# Patient Record
Sex: FEMALE | Race: WHITE | URBAN - METROPOLITAN AREA
[De-identification: names, ages, dates, MRNs, and addresses within clinical notes are randomized per-mention and may not be internally consistent; named-entity substitution may affect disease eponyms.]

---

## 2017-01-13 ENCOUNTER — OFFICE VISIT (OUTPATIENT)
Dept: PSYCHOLOGY | Facility: CLINIC | Age: 32
End: 2017-01-13

## 2017-01-13 DIAGNOSIS — F33.41 MAJOR DEPRESSIVE DISORDER, RECURRENT EPISODE, IN PARTIAL REMISSION WITH ANXIOUS DISTRESS (H): Primary | ICD-10-CM

## 2017-01-30 NOTE — PROGRESS NOTES
Health Psychology                  [facility]    Department of Medicine  Pilar Moe, Ph.D., L.P. [phone]                          Clinic and Surgery Center  [facility] Lenard Kim, Ph.D.,  L.P. [phone]                 Health Psychology - 3rd floor  [place] Mail Code 742   Zhao Chowdhury, Ph.D., DANAY.DEVENDRA.P.P., L.P. [phone]     [address] [place] [address] Carmela Taylor, Ph.D., L.P. [phone]  [place] [address]       Patient: Elizabeth Miner  Patient's YOB: 1985  MRN: [id number]  Psychologist: Lenard Kim, PhD,   Initial Evaluation Date: 8/5/2016  Treatment Plan Date: 8/5/2016      Health Psychology  Confidential Summary of Psychological Evaluation  Confidential Visit Summary    Service: Individual Psychotherapy     Start time:  1:05  Stop time:  2:00  Treatment modality:   Cognitive Behavioral and Supportive Psychotherapy   Insight Oriented Psychotherapy  Goals and Interventions:   Problem Goals   Mood Disorder  Depression  Anxiety   [x] Decrease symptoms  [x] Improve well being  [x] Improve coping  [] Change behavior/cognitions  [] Improve psychosocial support  [] Improve decision making  [] Improve self-care with diet and exercise  [] Improve sleep habits/sleep quality  [] Monitor psychological status     Interventions Employed Today   [x] Fostered healthy therapeutic alliance  [] Addressed unhealthy cognitions  [] Addressed unhealthy behaviors  [] Relaxation training  [] Psycho-education  [] Bibliotherapy  [x] Provided support  [] Explored/confronted resistance  [x] Developed insights into cognitions/behaviors  [x] Identified/Promoted adaptive coping strategies  [] Education/training in sleep   [] Education/training in mindfulness tools     Patient s progress on goals:   Discussed the holidays.  She employed some of the strategies discussed at our last meeting.  Discussed being with her brother who continues to  fight the cancer, despite the poor odds.  She sees the impact this has on her mother and worries about the prolonged de la torre on her.  Discussed the ways in which she is coping, in part by  herself emotionally from her brother.  Discussed this strategy and how it impacts her anxiety.    Discussed self-care in the lab and the challenges with setting healthy boundaries.   Reviewed general self-care tools.    Patient's response to treatment:  Engaged, reflective, motivated  Plan: Ongoing individual psychotherapy   Current mental health status:   General appearance:  Appropriate, well kept   Mood: Anxiety present, appears tense.    Cognition: Appropriate for age   Orientation: Times three   Insight: fair to good   Memory: Appropriate long term / Short term   Psychosis: Denies   Suicidal / Homicidal Thoughts: Denies      Diagnosis:    Axis I: Major Depressive Disorder, recurrent, in partial remission   Axis II: deferred   Axis III: see EMR   Axis IV: parental divorce, seriously ill family member        Lenard Kim, PhD  Licensed Psychologist  Pager: [phone]

## 2017-02-03 ENCOUNTER — OFFICE VISIT (OUTPATIENT)
Dept: PSYCHOLOGY | Facility: CLINIC | Age: 32
End: 2017-02-03

## 2017-02-03 DIAGNOSIS — F33.41 MAJOR DEPRESSIVE DISORDER, RECURRENT EPISODE, IN PARTIAL REMISSION WITH ANXIOUS DISTRESS (H): Primary | ICD-10-CM

## 2017-02-17 ENCOUNTER — OFFICE VISIT (OUTPATIENT)
Dept: PSYCHOLOGY | Facility: CLINIC | Age: 32
End: 2017-02-17

## 2017-02-17 DIAGNOSIS — F33.41 MAJOR DEPRESSIVE DISORDER, RECURRENT EPISODE, IN PARTIAL REMISSION WITH ANXIOUS DISTRESS (H): Primary | ICD-10-CM

## 2017-02-17 NOTE — MR AVS SNAPSHOT
After Visit Summary   2/17/2017    Elizabeth Miner    MRN: [id number]           Patient Information     Date Of Birth          1985        Visit Information        Provider Department      2/17/2017 12:00 PM Lenard Kim, PhD DOMINIC [facility]        Today's Diagnoses     Major depressive disorder, recurrent episode, in partial remission with anxious distress (H)    -  1       Follow-ups after your visit        Your next 10 appointments already scheduled     Mar 10, 2017  2:00 PM CST   (Arrive by 1:45 PM)   Return Visit with Lenard Kim, PhD DOMINIC   [facility] ([facility])    [address]  3rd [facility] [address] [phone]              Who to contact     Please call your clinic at [phone] to:    Ask questions about your health    Make or cancel appointments    Discuss your medicines    Learn about your test results    Speak to your doctor   If you have compliments or concerns about an experience at your clinic, or if you wish to file a complaint, please contact [facility] Patient Relations at [phone] or email us at [email or web address]         Additional Information About Your Visit        MyChart Information     [email or web address] gives you secure access to your electronic health record. If you see a primary care provider, you can also send messages to your care team and make appointments. If you have questions, please call your primary care clinic.  If you do not have a primary care provider, please call [phone] and they will assist you.      [employer] is an electronic gateway that provides easy, online access to your medical records. With [employer], you can request a clinic appointment, read your test results, renew a prescription or communicate with your care team.     To access your existing account, please contact your [facility] or  call [phone] for assistance.        Care EveryWhere ID     This is your Care EveryWhere ID. This could be used by other organizations to access your [place] medical records  [id number]         Blood Pressure from Last 3 Encounters:   10/31/16 111/77   05/20/16 118/76    Weight from Last 3 Encounters:   10/31/16 79.4 kg (175 lb)   05/20/16 75.7 kg (166 lb 12.8 oz)              Today, you had the following     No orders found for display       Primary Care Provider Office Phone # Fax #    Marisol Agnieszka Gray [phone]       [facility] [address] [facility] [address]  [facility] [address]        Thank you!     Thank you for choosing [facility] PRIMARY CARE CLINIC  for your care. Our goal is always to provide you with excellent care. Hearing back from our patients is one way we can continue to improve our services. Please take a few minutes to complete the written survey that you may receive in the mail after your visit with us. Thank you!             Your Updated Medication List - Protect others around you: Learn how to safely use, store and throw away your medicines at www.disposemymeds.org.          This list is accurate as of: 2/17/17 11:59 PM.  Always use your most recent med list.                   Brand Name Dispense Instructions for use    * FLUoxetine 40 MG capsule    PROzac    90 capsule    Take 1 capsule (40 mg) by mouth daily       * FLUoxetine 10 MG capsule    PROzac    90 capsule    Take 1 capsule (10 mg) by mouth daily       fluticasone 50 MCG/ACT spray    FLONASE     Spray 2 sprays into both nostrils as needed for rhinitis or allergies       levofloxacin 750 MG tablet    LEVAQUIN    7 tablet    Take 1 tablet (750 mg) by mouth daily       norgestimate-ethinyl estradiol 0.25-35 MG-MCG per tablet    ORTHO-CYCLEN, SPRINTEC    84 tablet    Take 1 tablet by mouth daily       * Notice:  This list has 2 medication(s) that are the same as other medications prescribed for you. Read  the directions carefully, and ask your doctor or other care provider to review them with you.

## 2017-02-22 NOTE — PROGRESS NOTES
Health Psychology                  [facility]    Department of Medicine  Pilar Moe, Ph.D., L.P. [phone]                          Clinic and Surgery Center  [facility] Lenard Kim, Ph.D.,  L.P. [phone]                 Health Psychology - 3rd floor  [place] Mail Code 74   Zhao Chowdhury, Ph.D., DANAY.DEVENDRA.P.P., L.P. [phone]     [address] [place] [address] Carmela Taylor, Ph.D., L.P. [phone]  [place] [address]       Patient: Elizabeth Miner  Patient's YOB: 1985  MRN: [id number]  Psychologist: Lenard Kim, PhD,   Initial Evaluation Date: 8/5/2016  Treatment Plan Date: 8/5/2016      Health Psychology  Confidential Summary of Psychological Evaluation  Confidential Visit Summary    Service: Individual Psychotherapy     Start time:  12:04  Stop time:  12:58  Treatment modality:   Cognitive Behavioral and Supportive Psychotherapy   Insight Oriented Psychotherapy  Goals and Interventions:   Problem Goals   Mood Disorder  Depression  Anxiety   [x] Decrease symptoms  [x] Improve well being  [x] Improve coping  [] Change behavior/cognitions  [x] Improve psychosocial support  [] Improve decision making  [] Improve self-care with diet and exercise  [] Improve sleep habits/sleep quality  [] Monitor psychological status     Interventions Employed Today   [x] Fostered healthy therapeutic alliance  [] Addressed unhealthy cognitions  [x] Addressed unhealthy behaviors  [] Relaxation training  [] Psycho-education  [] Bibliotherapy  [x] Provided support  [] Explored/confronted resistance  [x] Developed insights into cognitions/behaviors  [x] Identified/Promoted adaptive coping strategies  [] Education/training in sleep   [] Education/training in mindfulness tools     Patient s progress on goals:   Reports some improvement in anxiety since last session.  Discussed her current assessment of long term goals and her decision to not pursue  academia.    Discussed brothers condition and relationship with mother and father.    Discussed self-care.  Reviewed general self-care tools.    Patient's response to treatment:  Engaged, reflective, motivated  Plan: Ongoing individual psychotherapy   Current mental health status:   General appearance:  Appropriate, well kept   Mood: Anxious, though somewhat improved since last session.   Cognition: Appropriate for age   Orientation: Times three   Insight: fair to good   Memory: Appropriate long term / Short term   Psychosis: Denies   Suicidal / Homicidal Thoughts: Denies      Diagnosis:    Axis I: Major Depressive Disorder, recurrent, in partial remission   Axis II: deferred   Axis III: see EMR   Axis IV: parental divorce, seriously ill family member        Lenard Kim, PhD  Licensed Psychologist  Pager: [phone]

## 2017-03-10 ENCOUNTER — OFFICE VISIT (OUTPATIENT)
Dept: PSYCHOLOGY | Facility: CLINIC | Age: 32
End: 2017-03-10

## 2017-03-10 DIAGNOSIS — F33.41 MAJOR DEPRESSIVE DISORDER, RECURRENT EPISODE, IN PARTIAL REMISSION WITH ANXIOUS DISTRESS (H): Primary | ICD-10-CM

## 2017-03-10 NOTE — MR AVS SNAPSHOT
After Visit Summary   3/10/2017    Elizabeth Miner    MRN: [id number]           Patient Information     Date Of Birth          1985        Visit Information        Provider Department      3/10/2017 2:00 PM Lenard Kim, PhD [facility]        Today's Diagnoses     Major depressive disorder, recurrent episode, in partial remission with anxious distress (H)    -  1       Follow-ups after your visit        Your next 10 appointments already scheduled     May 23, 2017  1:00 PM CDT   (Arrive by 12:45 PM)   PHYSICAL with Marisol Gray MD   [facility] ([facility])    [address] [facility]  4th [facility] [address] [phone]              Who to contact     Please call your clinic at [phone] to:    Ask questions about your health    Make or cancel appointments    Discuss your medicines    Learn about your test results    Speak to your doctor   If you have compliments or concerns about an experience at your clinic, or if you wish to file a complaint, please contact [facility] Patient Relations at [phone] or email us at [email or web address]         Additional Information About Your Visit        MyChart Information     [employer] gives you secure access to your electronic health record. If you see a primary care provider, you can also send messages to your care team and make appointments. If you have questions, please call your primary care clinic.  If you do not have a primary care provider, please call [phone] and they will assist you.      [employer] is an electronic gateway that provides easy, online access to your medical records. With [employer], you can request a clinic appointment, read your test results, renew a prescription or communicate with your care team.     To access your existing account, please contact your [facility] or  call [phone] for assistance.        Care EveryWhere ID     This is your Care EveryWhere ID. This could be used by other organizations to access your [place] medical records  [id number]         Blood Pressure from Last 3 Encounters:   05/01/17 127/88   10/31/16 111/77   05/20/16 118/76    Weight from Last 3 Encounters:   05/01/17 78.5 kg (173 lb 1.6 oz)   10/31/16 79.4 kg (175 lb)   05/20/16 75.7 kg (166 lb 12.8 oz)              Today, you had the following     No orders found for display       Primary Care Provider Office Phone # Fax #    Marisol Agnieszka Gray [phone]       [address]  [facility] [address]        Thank you!     Thank you for choosing [facility] PRIMARY CARE CLINIC  for your care. Our goal is always to provide you with excellent care. Hearing back from our patients is one way we can continue to improve our services. Please take a few minutes to complete the written survey that you may receive in the mail after your visit with us. Thank you!             Your Updated Medication List - Protect others around you: Learn how to safely use, store and throw away your medicines at www.disposemymeds.org.          This list is accurate as of: 3/10/17 11:59 PM.  Always use your most recent med list.                   Brand Name Dispense Instructions for use    fluticasone 50 MCG/ACT spray    FLONASE     Spray 2 sprays into both nostrils as needed for rhinitis or allergies       levofloxacin 750 MG tablet    LEVAQUIN    7 tablet    Take 1 tablet (750 mg) by mouth daily

## 2017-04-28 ENCOUNTER — OFFICE VISIT (OUTPATIENT)
Dept: PSYCHOLOGY | Facility: CLINIC | Age: 32
End: 2017-04-28

## 2017-04-28 DIAGNOSIS — F33.41 MAJOR DEPRESSIVE DISORDER, RECURRENT EPISODE, IN PARTIAL REMISSION WITH ANXIOUS DISTRESS (H): Primary | ICD-10-CM

## 2017-04-28 NOTE — MR AVS SNAPSHOT
After Visit Summary   4/28/2017    Elizabeth Miner    MRN: [id number]           Patient Information     Date Of Birth          1985        Visit Information        Provider Department      4/28/2017 4:00 PM Lenard Kim, PhD DOMINIC [facility]        Today's Diagnoses     Major depressive disorder, recurrent episode, in partial remission with anxious distress (H)    -  1       Follow-ups after your visit        Your next 10 appointments already scheduled     Jun 05, 2017 12:00 PM CDT   (Arrive by 11:45 AM)   Return Visit with Lenard Kim, PhD DOMINIC   [facility] ([facility] [place])    [address]  3rd [facility] [address] [phone]              Who to contact     Please call your clinic at [phone] to:    Ask questions about your health    Make or cancel appointments    Discuss your medicines    Learn about your test results    Speak to your doctor   If you have compliments or concerns about an experience at your clinic, or if you wish to file a complaint, please contact [facility] Patient Relations at [phone] or email us at [email or web address]         Additional Information About Your Visit        MyChart Information     [employer] gives you secure access to your electronic health record. If you see a primary care provider, you can also send messages to your care team and make appointments. If you have questions, please call your primary care clinic.  If you do not have a primary care provider, please call [phone] and they will assist you.      [employer] is an electronic gateway that provides easy, online access to your medical records. With [employer], you can request a clinic appointment, read your test results, renew a prescription or communicate with your care team.     To access your existing account, please contact your [facility] or  call [phone] for assistance.        Care EveryWhere ID     This is your Care EveryWhere ID. This could be used by other organizations to access your [place] medical records  [id number]         Blood Pressure from Last 3 Encounters:   05/23/17 116/74   05/01/17 127/88   10/31/16 111/77    Weight from Last 3 Encounters:   05/23/17 78.2 kg (172 lb 8 oz)   05/01/17 78.5 kg (173 lb 1.6 oz)   10/31/16 79.4 kg (175 lb)              Today, you had the following     No orders found for display       Primary Care Provider Office Phone # Fax #    Marisol Agnieszka Gray [phone]       [address]  [facility] [address]        Thank you!     Thank you for choosing [facility] PRIMARY CARE CLINIC  for your care. Our goal is always to provide you with excellent care. Hearing back from our patients is one way we can continue to improve our services. Please take a few minutes to complete the written survey that you may receive in the mail after your visit with us. Thank you!             Your Updated Medication List - Protect others around you: Learn how to safely use, store and throw away your medicines at www.disposemymeds.org.          This list is accurate as of: 4/28/17 11:59 PM.  Always use your most recent med list.                   Brand Name Dispense Instructions for use    * FLUoxetine 40 MG capsule    PROzac    90 capsule    Take 1 capsule (40 mg) by mouth daily       * FLUoxetine 10 MG capsule    PROzac    90 capsule    Take 1 capsule (10 mg) by mouth daily       fluticasone 50 MCG/ACT spray    FLONASE     Spray 2 sprays into both nostrils as needed for rhinitis or allergies       norgestimate-ethinyl estradiol 0.25-35 MG-MCG per tablet    ORTHO-CYCLEN, SPRINTEC    84 tablet    Take 1 tablet by mouth daily       * Notice:  This list has 2 medication(s) that are the same as other medications prescribed for you. Read the directions carefully, and ask your doctor or  other care provider to review them with you.

## 2017-05-01 ENCOUNTER — OFFICE VISIT (OUTPATIENT)
Dept: FAMILY MEDICINE | Facility: CLINIC | Age: 32
End: 2017-05-01

## 2017-05-01 VITALS
TEMPERATURE: 97.7 F | BODY MASS INDEX: 29.55 KG/M2 | OXYGEN SATURATION: 97 % | HEART RATE: 92 BPM | WEIGHT: 173.1 LBS | HEIGHT: 64 IN | DIASTOLIC BLOOD PRESSURE: 88 MMHG | SYSTOLIC BLOOD PRESSURE: 127 MMHG

## 2017-05-01 DIAGNOSIS — L03.818 CELLULITIS OF OTHER SPECIFIED SITE: Primary | ICD-10-CM

## 2017-05-01 RX ORDER — SULFAMETHOXAZOLE/TRIMETHOPRIM 800-160 MG
1 TABLET ORAL 2 TIMES DAILY
Qty: 14 TABLET | Refills: 0 | Status: SHIPPED | OUTPATIENT
Start: 2017-05-01 | End: 2018-03-07

## 2017-05-01 ASSESSMENT — PATIENT HEALTH QUESTIONNAIRE - PHQ9: 5. POOR APPETITE OR OVEREATING: NEARLY EVERY DAY

## 2017-05-01 ASSESSMENT — ANXIETY QUESTIONNAIRES
IF YOU CHECKED OFF ANY PROBLEMS ON THIS QUESTIONNAIRE, HOW DIFFICULT HAVE THESE PROBLEMS MADE IT FOR YOU TO DO YOUR WORK, TAKE CARE OF THINGS AT HOME, OR GET ALONG WITH OTHER PEOPLE: SOMEWHAT DIFFICULT
2. NOT BEING ABLE TO STOP OR CONTROL WORRYING: NOT AT ALL
7. FEELING AFRAID AS IF SOMETHING AWFUL MIGHT HAPPEN: NOT AT ALL
1. FEELING NERVOUS, ANXIOUS, OR ON EDGE: MORE THAN HALF THE DAYS
3. WORRYING TOO MUCH ABOUT DIFFERENT THINGS: SEVERAL DAYS
6. BECOMING EASILY ANNOYED OR IRRITABLE: NEARLY EVERY DAY

## 2017-05-01 ASSESSMENT — PAIN SCALES - GENERAL: PAINLEVEL: MILD PAIN (2)

## 2017-05-01 NOTE — MR AVS SNAPSHOT
After Visit Summary   5/1/2017    Elizabeth Miner    MRN: [id number]           Patient Information     Date Of Birth          1985        Visit Information        Provider Department      5/1/2017 10:30 AM Keila Arora APRN [facility]        Today's Diagnoses     Cellulitis of other specified site    -  1       Follow-ups after your visit        Your next 10 appointments already scheduled     May 23, 2017  1:00 PM CDT   (Arrive by 12:45 PM)   PHYSICAL with Marisol Gray MD   [facility] ([facility])    [address] [place] [address] [phone]              Who to contact     Please call your clinic at [phone] to:    Ask questions about your health    Make or cancel appointments    Discuss your medicines    Learn about your test results    Speak to your doctor   If you have compliments or concerns about an experience at your clinic, or if you wish to file a complaint, please contact [facility] Patient Relations at [phone] or email us at [email or web address]         Additional Information About Your Visit        MyChart Information     [employer] gives you secure access to your electronic health record. If you see a primary care provider, you can also send messages to your care team and make appointments. If you have questions, please call your primary care clinic.  If you do not have a primary care provider, please call [phone] and they will assist you.      [employer] is an electronic gateway that provides easy, online access to your medical records. With [employer], you can request a clinic appointment, read your test results, renew a prescription or communicate with your care team.     To access your existing account, please contact your [facility] or call [phone] for assistance.        Care EveryWhere ID   "   This is your Care EveryWhere ID. This could be used by other organizations to access your [place] medical records  [id number]        Your Vitals Were     Pulse Temperature Height Pulse Oximetry Breastfeeding? BMI (Body Mass Index)    92 97.7  F (36.5  C) (Oral) 5' 4.3\" (163.3 cm) 97% No 29.44 kg/m2       Blood Pressure from Last 3 Encounters:   05/01/17 127/88   10/31/16 111/77   05/20/16 118/76    Weight from Last 3 Encounters:   05/01/17 173 lb 1.6 oz (78.5 kg)   10/31/16 175 lb (79.4 kg)   05/20/16 166 lb 12.8 oz (75.7 kg)              Today, you had the following     No orders found for display         Today's Medication Changes          These changes are accurate as of: 5/1/17  2:07 PM.  If you have any questions, ask your nurse or doctor.               Start taking these medicines.        Dose/Directions    sulfamethoxazole-trimethoprim 800-160 MG per tablet   Commonly known as:  BACTRIM DS/SEPTRA DS   Used for:  Cellulitis of other specified site   Started by:  Keila Arora APRN CNP        Dose:  1 tablet   Take 1 tablet by mouth 2 times daily   Quantity:  14 tablet   Refills:  0            Where to get your medicines      These medications were sent to [facility] [place], [place] - [address] [facility] [address] [facility] [address]  [address] [facility] [address] [place] [address]    Hours:  TRANSPLANT PHONE NUMBER [phone] Phone:  [phone]     sulfamethoxazole-trimethoprim 800-160 MG per tablet                Primary Care Provider Office Phone # Fax #    Marisol Gary [phone]       [address]  [facility] [address]        Thank you!     Thank you for choosing [facility]  for your care. Our goal is always to provide you with excellent care. Hearing back from our patients is one way we can continue to improve our services. Please take a few minutes to complete the written survey that you may receive in the " mail after your visit with us. Thank you!             Your Updated Medication List - Protect others around you: Learn how to safely use, store and throw away your medicines at www.disposemymeds.org.          This list is accurate as of: 5/1/17  2:07 PM.  Always use your most recent med list.                   Brand Name Dispense Instructions for use    * FLUoxetine 40 MG capsule    PROzac    90 capsule    Take 1 capsule (40 mg) by mouth daily       * FLUoxetine 10 MG capsule    PROzac    90 capsule    Take 1 capsule (10 mg) by mouth daily       fluticasone 50 MCG/ACT spray    FLONASE     Spray 2 sprays into both nostrils as needed for rhinitis or allergies       levofloxacin 750 MG tablet    LEVAQUIN    7 tablet    Take 1 tablet (750 mg) by mouth daily       norgestimate-ethinyl estradiol 0.25-35 MG-MCG per tablet    ORTHO-CYCLEN, SPRINTEC    84 tablet    Take 1 tablet by mouth daily       sulfamethoxazole-trimethoprim 800-160 MG per tablet    BACTRIM DS/SEPTRA DS    14 tablet    Take 1 tablet by mouth 2 times daily       * Notice:  This list has 2 medication(s) that are the same as other medications prescribed for you. Read the directions carefully, and ask your doctor or other care provider to review them with you.

## 2017-05-01 NOTE — NURSING NOTE
"31 year old  Chief Complaint   Patient presents with     Infection     just got cartilage piercing, right ear, two weeks piercing, swollen.        Blood pressure 127/88, pulse 92, temperature 97.7  F (36.5  C), temperature source Oral, height 5' 4.3\" (163.3 cm), weight 173 lb 1.6 oz (78.5 kg), SpO2 97 %, not currently breastfeeding. Body mass index is 29.44 kg/(m^2).  BP completed using cuff size: regular    Patient Active Problem List   Diagnosis     Major depressive disorder, recurrent episode, in partial remission with anxious distress (H)       Wt Readings from Last 2 Encounters:   05/01/17 173 lb 1.6 oz (78.5 kg)   10/31/16 175 lb (79.4 kg)     BP Readings from Last 3 Encounters:   05/01/17 127/88   10/31/16 111/77   05/20/16 118/76       Current Outpatient Prescriptions   Medication     FLUoxetine (PROZAC) 40 MG capsule     FLUoxetine (PROZAC) 10 MG capsule     norgestimate-ethinyl estradiol (ORTHO-CYCLEN, SPRINTEC) 0.25-35 MG-MCG per tablet     fluticasone (FLONASE) 50 MCG/ACT nasal spray     levofloxacin (LEVAQUIN) 750 MG tablet     No current facility-administered medications for this visit.        Social History   Substance Use Topics     Smoking status: Never Smoker     Smokeless tobacco: Not on file     Alcohol use Not on file       There are no preventive care reminders to display for this patient.    Lab Results   Component Value Date    PAP NIL 05/20/2016       PHQ-2 ( 1999 Pfizer) 5/1/2017 9/19/2016   Q1: Little interest or pleasure in doing things 1 -   Q2: Feeling down, depressed or hopeless 1 -   PHQ-2 Score 2 -   Little interest or pleasure in doing things - More than half the days   Feeling down, depressed or hopeless - More than half the days   PHQ-2 Score - 4       PHQ-9 SCORE 8/27/2016 9/19/2016 10/31/2016 5/1/2017   Total Score MyChart 11 (Moderate depression) 9 (Mild depression) - -   Total Score - - 10 4   Some encounter information is confidential and restricted. Go to Review Flowsheets " activity to see all data.       ELY-7 SCORE 8/27/2016 9/19/2016 10/31/2016   Total Score 3 (minimal anxiety) 3 (minimal anxiety) -   Total Score - - 3   Some encounter information is confidential and restricted. Go to Review Flowsheets activity to see all data.       No flowsheet data found.    Erica Sweeney MA  May 1, 2017 10:40 AM

## 2017-05-01 NOTE — PROGRESS NOTES
HPI:       Elizabeth Miner is a 31 year old who presents following a piercing in her right helix. It is swollen and red. She is concerned it is infected. She denies fevers, nausea, vomiting, weakness, or other symptoms. She is unable to tell if it is draining. She is washing with soap and water, rinsing with saline. She is seen at the primary care clinic at the [facility]. She has multiple antibiotic allergies and were reviewed with pt.     Problem, Medication and Allergy Lists were   reviewed and are current.     Patient Active Problem List    Diagnosis Date Noted     Major depressive disorder, recurrent episode, in partial remission with anxious distress (H) 08/05/2016     Priority: Medium   ,     Current Outpatient Prescriptions   Medication Sig Dispense Refill     sulfamethoxazole-trimethoprim (BACTRIM DS/SEPTRA DS) 800-160 MG per tablet Take 1 tablet by mouth 2 times daily 14 tablet 0     FLUoxetine (PROZAC) 40 MG capsule Take 1 capsule (40 mg) by mouth daily 90 capsule 0     FLUoxetine (PROZAC) 10 MG capsule Take 1 capsule (10 mg) by mouth daily 90 capsule 0     norgestimate-ethinyl estradiol (ORTHO-CYCLEN, SPRINTEC) 0.25-35 MG-MCG per tablet Take 1 tablet by mouth daily 84 tablet 3     fluticasone (FLONASE) 50 MCG/ACT nasal spray Spray 2 sprays into both nostrils as needed for rhinitis or allergies       levofloxacin (LEVAQUIN) 750 MG tablet Take 1 tablet (750 mg) by mouth daily (Patient not taking: Reported on 5/1/2017) 7 tablet 0   ,     Allergies   Allergen Reactions     Azithromycin      Lips swell     Cephalexin      hives     Penicillins      hives     Patient is   a new patient to this clinic and so  I reviewed/updated the Past Medical History, the Family History and the Social History. , No past medical history on file.,   Family History        Negative family history of: Glaucoma, Macular Degeneration       and   Social History     Social History     Marital status: Single     Spouse name: N/A      "Number of children: N/A     Years of education: N/A     Social History Main Topics     Smoking status: Never Smoker     Smokeless tobacco: None     Alcohol use None     Drug use: None     Sexual activity: Not Asked     Other Topics Concern     None     Social History Narrative            Review of Systems:   Review of Systems          Physical Exam:   Patient Vitals for the past 24 hrs:   BP Temp Temp src Pulse SpO2 Height Weight   05/01/17 1035 127/88 97.7  F (36.5  C) Oral 92 97 % 5' 4.3\" (163.3 cm) 173 lb 1.6 oz (78.5 kg)     Body mass index is 29.44 kg/(m^2).  Vitals were reviewed and were normal     Physical Exam   Constitutional: She is oriented to person, place, and time. She appears well-developed and well-nourished.   HENT:   Head: Normocephalic and atraumatic.   Left Ear: External ear normal.   Nose: Nose normal.   Mouth/Throat: Oropharynx is clear and moist. No oropharyngeal exudate.   Right helix with erythema, edema, warmth. Purulent drainage upon palpation.    Eyes: Conjunctivae and EOM are normal. Pupils are equal, round, and reactive to light.   Neck: Normal range of motion. Neck supple.   Cardiovascular: Normal rate, regular rhythm and normal heart sounds.    Pulmonary/Chest: Effort normal and breath sounds normal. She has no wheezes. She has no rales. She exhibits no tenderness.   Abdominal: Soft. She exhibits no distension. There is no tenderness.   Musculoskeletal: Normal range of motion.   Lymphadenopathy:     She has no cervical adenopathy.   Neurological: She is alert and oriented to person, place, and time. She has normal reflexes.   Skin: Skin is warm and dry. No rash noted. No erythema.   Psychiatric: She has a normal mood and affect. Her behavior is normal. Judgment and thought content normal.         Results:      Results from the last 24 hoursNo results found for this or any previous visit (from the past 24 hour(s)).  Assessment and Plan       Diagnoses and all orders for this " visit:    Cellulitis of the ear  sulfamethoxazole-trimethoprim (BACTRIM DS/SEPTRA DS) 800-160 MG per tablet; Take 1 tablet by mouth 2 times daily x 7 days  On exam erythematous, edematous, warm, tender, draining purulent exudate. Will treat as cellulitis. If any worsening pt may need to remove piercing. Continue to wash with soap and water. RTC if no improvement in symptoms      There are no discontinued medications.  Options for treatment and follow-up care were reviewed with the patient. Elizabeth Miner  engaged in the decision making process and verbalized understanding of the options discussed and agreed with the final plan.    THOMPSON Swan CNP

## 2017-05-01 NOTE — PROGRESS NOTES
Health Psychology                  [facility]    Department of Medicine  Pilar Moe, Ph.D., L.P. [phone]                          Clinic and Surgery Center  [facility] Lenard Kim, Ph.D.,  L.P. [phone]                 Health Psychology - 3rd floor  [place] [address]   Zhao Chowdhury, Ph.D., A.B.P.P., L.P. [phone]     [address] [place] [address] Carmela Taylor, Ph.D., L.P. [phone]  [place] [address]       Patient: Elizabeth Miner  Patient's YOB: 1985  MRN: [id number]  Psychologist: Lenard Kim, PhD,   Initial Evaluation Date: 8/5/2016  Treatment Plan Date: 8/5/2016      Health Psychology  Confidential Summary of Psychological Evaluation  Confidential Visit Summary    Service: Individual Psychotherapy     Start time:  2:06  Stop time:  2:50  Treatment modality:   Cognitive Behavioral and Supportive Psychotherapy   Insight Oriented Psychotherapy  Goals and Interventions:   Problem Goals   Mood Disorder  Depression  Anxiety   [x] Decrease symptoms  [x] Improve well being  [x] Improve coping  [] Change behavior/cognitions  [x] Improve psychosocial support  [] Improve decision making  [] Improve self-care with diet and exercise  [] Improve sleep habits/sleep quality  [] Monitor psychological status     Interventions Employed Today   [x] Fostered healthy therapeutic alliance  [] Addressed unhealthy cognitions  [x] Addressed unhealthy behaviors  [] Relaxation training  [] Psycho-education  [] Bibliotherapy  [x] Provided support  [] Explored/confronted resistance  [x] Developed insights into cognitions/behaviors  [x] Identified/Promoted adaptive coping strategies  [] Education/training in sleep   [] Education/training in mindfulness tools     Patient s progress on goals:   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  Discussed the difficulty she has with setting and  maintaining boundaries with others.  Discussed self-care tools.  Encouraged exercise and regular work breaks.   Patient's response to treatment:  Engaged, reflective, motivated  Plan: Ongoing individual psychotherapy   Current mental health status:   General appearance:  Appropriate, well kept   Mood: anxiety, mild   Affect: mood congruent    Cognition: Appropriate for age   Orientation: Times three   Insight: fair to good   Memory: Appropriate long term / Short term   Psychosis: Denies   Suicidal / Homicidal Thoughts: Denies      Diagnosis:    Axis I: Major Depressive Disorder, recurrent, in partial remission   Axis II: deferred   Axis III: see EMR   Axis IV: parental divorce, seriously ill family member        Lenard Kim, PhD  Licensed Psychologist  Pager: [phone]

## 2017-05-02 ASSESSMENT — PATIENT HEALTH QUESTIONNAIRE - PHQ9: SUM OF ALL RESPONSES TO PHQ QUESTIONS 1-9: 4

## 2017-05-23 ENCOUNTER — OFFICE VISIT (OUTPATIENT)
Dept: INTERNAL MEDICINE | Facility: CLINIC | Age: 32
End: 2017-05-23

## 2017-05-23 VITALS
HEIGHT: 64 IN | SYSTOLIC BLOOD PRESSURE: 116 MMHG | DIASTOLIC BLOOD PRESSURE: 74 MMHG | HEART RATE: 93 BPM | WEIGHT: 172.5 LBS | BODY MASS INDEX: 29.45 KG/M2

## 2017-05-23 DIAGNOSIS — F43.21 ADJUSTMENT DISORDER WITH DEPRESSED MOOD: Primary | ICD-10-CM

## 2017-05-23 RX ORDER — FLUOXETINE HYDROCHLORIDE 60 MG/1
60 TABLET, FILM COATED ORAL; ORAL DAILY
Qty: 90 TABLET | Refills: 3 | Status: SHIPPED | OUTPATIENT
Start: 2017-05-23 | End: 2018-03-29

## 2017-05-23 ASSESSMENT — ENCOUNTER SYMPTOMS
INCREASED ENERGY: 0
STIFFNESS: 1
DEPRESSION: 1
PANIC: 0
NIGHT SWEATS: 0
DECREASED APPETITE: 0
DECREASED LIBIDO: 0
CONSTIPATION: 0
NAUSEA: 0
CHILLS: 0
ARTHRALGIAS: 0
TREMORS: 0
JOINT SWELLING: 0
SEIZURES: 0
JAUNDICE: 0
POLYDIPSIA: 0
DIARRHEA: 0
HEADACHES: 0
WEIGHT LOSS: 0
VOMITING: 0
ALTERED TEMPERATURE REGULATION: 0
HEARTBURN: 0
FEVER: 0
WEIGHT GAIN: 1
NUMBNESS: 0
ABDOMINAL PAIN: 0
MEMORY LOSS: 0
INSOMNIA: 0
BOWEL INCONTINENCE: 0
TINGLING: 1
BLOOD IN STOOL: 0
BACK PAIN: 0
RECTAL BLEEDING: 1
HALLUCINATIONS: 0
DISTURBANCES IN COORDINATION: 0
DIZZINESS: 0
NECK PAIN: 0
DECREASED CONCENTRATION: 1
NERVOUS/ANXIOUS: 1
SPEECH CHANGE: 0
MUSCLE WEAKNESS: 0
PARALYSIS: 0
POLYPHAGIA: 0
FATIGUE: 1
LOSS OF CONSCIOUSNESS: 0
WEAKNESS: 0
BLOATING: 0
MYALGIAS: 0
RECTAL PAIN: 0
HOT FLASHES: 0
MUSCLE CRAMPS: 1

## 2017-05-23 ASSESSMENT — ACTIVITIES OF DAILY LIVING (ADL)
ARE_THERE_CARBON_MONOXIDE_DETECTORS_IN_YOUR_HOME?: Y
ARE_THERE_SMOKE_DETECTORS_IN_YOUR_HOME?: Y
DO_MEMBERS_OF_YOUR_HOUSEHOLD_USE_SAFETY_HELMETS?: Y
DO_MEMBERS_OF_YOUR_HOUSEHOLD_WEAR_SEAT_BELTS?: Y
ARE_THERE_FIREARMS_IN_YOUR_HOME?: N
DO_MEMBERS_OF_YOUR_HOUSEHOLD_USE_SUNSCREEN?: Y

## 2017-05-23 ASSESSMENT — PAIN SCALES - GENERAL: PAINLEVEL: NO PAIN (0)

## 2017-05-23 NOTE — PATIENT INSTRUCTIONS
[facility] Medication Refill Request Information:  * Please contact your pharmacy regarding ANY request for medication refills.  ** [facility] Prescription Fax = [phone]  * Please allow 3 business days for routine medication refills.  * Please allow 5 business days for controlled substance medication refills.     [facility] Test Result notification information:  *You will be notified with in 7-10 days of your appointment day regarding the results of your test.  If you are on MyChart you will be notified as soon as the provider has reviewed the results and signed off on them.    [facility] [phone]

## 2017-05-23 NOTE — PROGRESS NOTES
"Chief Complaint  Elizabeth Miner is a 31 year old female with a history of asplenia, MDD with anxiety who presents for her annual physical exam and f/u depression.    SUBJECTIVE      Annual Physical Exam:  Last seen 5/20/2016.  No acute events since that time.      Depression/Anxiety:  One week ago, patient increased her fluoxetine to 60 mg QD from 50 mg QD.  She is experiencing increased stress related to sick family member and parents' divorce.  Reports the new dose is working fine.  Denies suicidal ideation, thoughts of self-harm, overuse of alcohol, illicit drugs or prescribed medications.  No affect on her ability to work or perform ADL. Still seeing Dr. Kim, a Psychologist here at the [facility], every 2-3 weeks.      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  Her discomfort is not associated with pain, burning, numbness/tingling, weakness, recent insect bites, or trauma.  It is made better with movement, particularly walking, and PT exercises for plantar fasciitis that she discovered on the internet.  No interference with work or ADL.      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  It has returned but does not interfere with her work or ability to perform ADL.    Medications and Allergies  Reviewed and updated in the patient's chart as needed.     Health Hazards  Never smoker. Occasional social alcohol use.  Denies illicit drug use.  Not socially active outside of her lab mates.    Preventative Screening  Up to date on vaccinations and age-appropriate health-screening.    Review of Systems  A 10-point ROS was negative other than that stated above.    OBJECTIVE  /74  Pulse 93  Ht 1.631 m (5' 4.21\")  Wt 78.2 kg (172 lb 8 oz)  Breastfeeding? No  BMI 29.41 kg/m2    Physical Exam   GENERAL:  " Awake. No acute distress. Pleasant and in good spirits.  SKIN:  No rash, excoriations, ecchymoses or discolorations.  HEENT:  Normocephalic.  Anicteric sclera. Tympanic membranes white and opaque no erythema or bulging. Mild cerumen in the left eart. Patent nares. Good dentition. Oropharyngeal mucosa pink, moist, no erythema or exudates.  NECK:  Full ROM. No lymphadenopathy, or goiter.   CARDIOVASCULAR:  RRR.  Normal S1 and S2.  No murmurs, rubs, gallops or clicks.  PULMONARY:  CTAB. No wheezes, rales or rhonchi.  ABDOMEN & PELVIS:  Midline healed surgical scar extending from sternum to umbilicus.  Normoactive bowel sounds.  Non-tender, non-distended. No masses or hepatosplenomegaly.  EXTREMITIES:  Full ROM in all extremities.  Normal left-unilateral wrist extension and flexion.  Negative Tinel sign.  No weakness No peripheral edema. Peripheral pulses 2+ bilaterally.  Negative Tinel test and Phalen.    Neurological Exam  Alert and oriented to person, place, time and situation.  No gross or focal deficits.  No anosmia.  PERRLA. EOMI.  Muscles of mastication strong.  No ptosis. Facial muscles strong and symmetric. Hearing intact bilaterally.  Elevates palate, no hoarse voice. Trapezii and SCM strength 5/5.  Tongue protrudes midline.  Strength 5/5 and DTR 2+ bilaterally in all extremities. Touch sensation intact bilaterally.  Negative Romberg.  No tremor or dysmetria.  Normal station and gait.    Psychiatric Exam  Well-groomed.  Normal affect.  Mood congruent.  Behavior: appropriate with good eye-contact.  Attitude: cooperative.  Speech not tangential or circumferential.  No delusions or auditory/visual hallucinations.    Labs, Tests & Imaging  No new results to discuss at this time.    ASSESSMENT / PLAN  #Health Maintenance and Screening: Patient is in good health. Physical exam was benign.  UTD on all vaccinations. Normal periods.      Pap due next year; thereafter Q5 years with HPV co-testing    Refills of her OCP  offered.    Will discuss an exercise regimen at our next visit.    #Depression/Anxiety: Well-managed on medication. No acute events.      Increase fluoxetine to 60 mg QD, Rx provided.    Continue seeing your therapist as needed.    #Bilateral pedal stiffness: The DDx of stiffness after prolonged immobility in this patient includes: 1) lack of regular exercise, and 2) increased depression/anxiety.  Other etiologies are possible.  Lack of fever, chills, bone pain, traumatic fractures, sepsis, make a more serious cause unlikely. If patient develops any of these signs/sx, imaging should be considered.       Discuss more regular exercise regimen at a later visit.    Encouraged patient to move more throughout the day.    #Ulnar Neuropathy, left unilateral: Sx have improved. No need for EMG confirmatory testing at this time.       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    Bri Parkinson, Ph.M.  Internal Medicine,  MS4  2:09 PM, May 23, 2017    Scribe Disclosure:   I, Bri Parkinson, am serving as a scribe to document services personally performed by Staff-Dr. Marisol Gray based review of patient's EHR in [employer], and provider statements to me.    The medical student acted as scribe and the encounter documented above was completely performed by myself.  Supervising Doctor Isaac Castro

## 2017-05-23 NOTE — NURSING NOTE
Chief Complaint   Patient presents with     Physical     Patient here for physical.      Guevara Abreu CMA at 1:02 PM on 5/23/2017.

## 2017-05-23 NOTE — MR AVS SNAPSHOT
After Visit Summary   5/23/2017    Elizabeth Miner    MRN: [id number]           Patient Information     Date Of Birth          1985        Visit Information        Provider Department      5/23/2017 1:00 PM Marisol Serrato MD [facility]        Today's Diagnoses     Adjustment disorder with depressed mood    -  1      Care Instructions    Primary Care Center Medication Refill Request Information:  * Please contact your pharmacy regarding ANY request for medication refills.  ** [facility] Prescription Fax = [phone]  * Please allow 3 business days for routine medication refills.  * Please allow 5 business days for controlled substance medication refills.     Primary Care Center Test Result notification information:  *You will be notified with in 7-10 days of your appointment day regarding the results of your test.  If you are on MyChart you will be notified as soon as the provider has reviewed the results and signed off on them.    [facility] [phone]           Follow-ups after your visit        Your next 10 appointments already scheduled     Jun 05, 2017 12:00 PM CDT   (Arrive by 11:45 AM)   Return Visit with Lenard Kim, PhD [facility] ([facility])    [address] [place] [id number] [phone]              Who to contact     Please call your clinic at [phone] to:    Ask questions about your health    Make or cancel appointments    Discuss your medicines    Learn about your test results    Speak to your doctor   If you have compliments or concerns about an experience at your clinic, or if you wish to file a complaint, please contact [facility] Patient Relations at [phone] or email us at [email or web address]         Additional Information About Your Visit        MyChart Information     [employer] gives you secure access to your  "electronic health record. If you see a primary care provider, you can also send messages to your care team and make appointments. If you have questions, please call your primary care clinic.  If you do not have a primary care provider, please call [phone] and they will assist you.      [employer] is an electronic gateway that provides easy, online access to your medical records. With [employer], you can request a clinic appointment, read your test results, renew a prescription or communicate with your care team.     To access your existing account, please contact your [facility] or call [phone] for assistance.        Care EveryWhere ID     This is your Care EveryWhere ID. This could be used by other organizations to access your [facility] medical records  [id number]        Your Vitals Were     Pulse Height Breastfeeding? BMI (Body Mass Index)          93 1.631 m (5' 4.21\") No 29.41 kg/m2         Blood Pressure from Last 3 Encounters:   05/23/17 116/74   05/01/17 127/88   10/31/16 111/77    Weight from Last 3 Encounters:   05/23/17 78.2 kg (172 lb 8 oz)   05/01/17 78.5 kg (173 lb 1.6 oz)   10/31/16 79.4 kg (175 lb)              Today, you had the following     No orders found for display         Today's Medication Changes          These changes are accurate as of: 5/23/17  2:11 PM.  If you have any questions, ask your nurse or doctor.               These medicines have changed or have updated prescriptions.        Dose/Directions    * FLUoxetine 40 MG capsule   Commonly known as:  PROzac   This may have changed:    - Another medication with the same name was added. Make sure you understand how and when to take each.  - Another medication with the same name was changed. Make sure you understand how and when to take each.   Used for:  Adjustment disorder with depressed mood   Changed by:  Marisol Serrato MD        Dose:  40 mg   Take 1 capsule (40 mg) by mouth daily "   Quantity:  90 capsule   Refills:  0       * FLUoxetine 10 MG capsule   Commonly known as:  PROzac   This may have changed:    - how much to take  - additional instructions   Used for:  Adjustment disorder with depressed mood   Changed by:  Marisol Serrato MD        Dose:  10 mg   Take 1 capsule (10 mg) by mouth daily   Quantity:  90 capsule   Refills:  0       * FLUoxetine HCl 60 MG Tabs   This may have changed:  You were already taking a medication with the same name, and this prescription was added. Make sure you understand how and when to take each.   Used for:  Adjustment disorder with depressed mood   Changed by:  Marisol Serrato MD        Dose:  60 mg   Take 60 mg by mouth daily   Quantity:  90 tablet   Refills:  3       * Notice:  This list has 3 medication(s) that are the same as other medications prescribed for you. Read the directions carefully, and ask your doctor or other care provider to review them with you.      Stop taking these medicines if you haven't already. Please contact your care team if you have questions.     levofloxacin 750 MG tablet   Commonly known as:  LEVAQUIN   Stopped by:  Marisol Serrato MD                Where to get your medicines      Some of these will need a paper prescription and others can be bought over the counter.  Ask your nurse if you have questions.     Bring a paper prescription for each of these medications     FLUoxetine HCl 60 MG Tabs                Primary Care Provider Office Phone # Fax #    Marisol Gray [phone]       [address]  [facility] [address]        Thank you!     Thank you for choosing [facility]  for your care. Our goal is always to provide you with excellent care. Hearing back from our patients is one way we can continue to improve our services. Please take a few minutes to complete the written survey that you may receive in the  mail after your visit with us. Thank you!             Your Updated Medication List - Protect others around you: Learn how to safely use, store and throw away your medicines at www.disposemymeds.org.          This list is accurate as of: 5/23/17  2:11 PM.  Always use your most recent med list.                   Brand Name Dispense Instructions for use    * FLUoxetine 40 MG capsule    PROzac    90 capsule    Take 1 capsule (40 mg) by mouth daily       * FLUoxetine 10 MG capsule    PROzac    90 capsule    Take 1 capsule (10 mg) by mouth daily       * FLUoxetine HCl 60 MG Tabs     90 tablet    Take 60 mg by mouth daily       fluticasone 50 MCG/ACT spray    FLONASE     Spray 2 sprays into both nostrils as needed for rhinitis or allergies       norgestimate-ethinyl estradiol 0.25-35 MG-MCG per tablet    ORTHO-CYCLEN, SPRINTEC    84 tablet    Take 1 tablet by mouth daily       sulfamethoxazole-trimethoprim 800-160 MG per tablet    BACTRIM DS/SEPTRA DS    14 tablet    Take 1 tablet by mouth 2 times daily       * Notice:  This list has 3 medication(s) that are the same as other medications prescribed for you. Read the directions carefully, and ask your doctor or other care provider to review them with you.

## 2017-06-01 NOTE — PROGRESS NOTES
Health Psychology                  [facility]    Department of Medicine  Pilar Moe, Ph.D., L.P. [phone]                          Clinic and Surgery Center  [facility] Lenard Kim, Ph.D.,  L.P. [phone]                 Health Psychology  3rd [facility] Mail Code 740   Zhao Chowdhury, Ph.D., A.B.P.P., L.P. [phone]     [address] [place] [address] Carmela Taylor, Ph.D., L.P. [phone]  [place] [address]       Patient: Elizabeth Miner  Patient's YOB: 1985  MRN: [id number]  Psychologist: Lenard Kim, PhD,   Initial Evaluation Date: 8/5/2016  Treatment Plan Date: 8/5/2016      Health Psychology  Confidential Summary of Psychological Evaluation  Confidential Visit Summary    Service: Individual Psychotherapy     Start time:  4:06  Stop time:  4:52  Treatment modality:   Cognitive Behavioral and Supportive Psychotherapy   Insight Oriented Psychotherapy  Goals and Interventions:   Problem Goals   Mood Disorder  Depression  Anxiety   [x] Decrease symptoms  [x] Improve well being  [x] Improve coping  [] Change behavior/cognitions  [x] Improve psychosocial support  [] Improve decision making  [] Improve self-care with diet and exercise  [] Improve sleep habits/sleep quality  [] Monitor psychological status     Interventions Employed Today   [x] Fostered healthy therapeutic alliance  [] Addressed unhealthy cognitions  [x] Addressed unhealthy behaviors  [] Relaxation training  [] Psycho-education  [] Bibliotherapy  [x] Provided support  [] Explored/confronted resistance  [x] Developed insights into cognitions/behaviors  [x] Identified/Promoted adaptive coping strategies  [] Education/training in sleep   [] Education/training in mindfulness tools     Patient s progress on goals:   Explored ways in which his current relationships repeat historical family patterns.  Discussed relationships that defy this pattern and how they  do so.  Dsicussed self-care.    Her brother's health is continuing to deteriorate.  Dsicussed her concern for her mother.  Dsicussed her own feelings about her brother's death, which she may not be fully in touch with.   Patient's response to treatment:  Engaged, reflective  Plan: Ongoing individual psychotherapy   Current mental health status:   General appearance:  Appropriate, well kept   Mood: work stressors   Affect: mood congruent    Cognition: Appropriate for age   Orientation: Times three   Insight: fair to good   Memory: Appropriate long term / Short term   Psychosis: Denies   Suicidal / Homicidal Thoughts: Denies      Diagnosis:    Axis I: Major Depressive Disorder, recurrent, in partial remission   Axis II: deferred   Axis III: see EMR   Axis IV: parental divorce, seriously ill family member        Lenard Kim, PhD  Licensed Psychologist  Pager: [phone]

## 2017-06-05 ENCOUNTER — OFFICE VISIT (OUTPATIENT)
Dept: PSYCHOLOGY | Facility: CLINIC | Age: 32
End: 2017-06-05

## 2017-06-05 DIAGNOSIS — F33.41 MAJOR DEPRESSIVE DISORDER, RECURRENT EPISODE, IN PARTIAL REMISSION WITH ANXIOUS DISTRESS (H): Primary | ICD-10-CM

## 2017-06-05 NOTE — MR AVS SNAPSHOT
After Visit Summary   6/5/2017    Elizabeth Miner    MRN: [id number]           Patient Information     Date Of Birth          1985        Visit Information        Provider Department      6/5/2017 12:00 PM Lenard Kim, PhD [facility]        Today's Diagnoses     Major depressive disorder, recurrent episode, in partial remission with anxious distress (H)    -  1       Follow-ups after your visit        Who to contact     Please call your clinic at [phone] to:    Ask questions about your health    Make or cancel appointments    Discuss your medicines    Learn about your test results    Speak to your doctor   If you have compliments or concerns about an experience at your clinic, or if you wish to file a complaint, please contact [facility] Patient Relations at [phone] or email us at [email or web address]         Additional Information About Your Visit        MyChart Information     [employer] gives you secure access to your electronic health record. If you see a primary care provider, you can also send messages to your care team and make appointments. If you have questions, please call your primary care clinic.  If you do not have a primary care provider, please call [phone] and they will assist you.      [employer] is an electronic gateway that provides easy, online access to your medical records. With [employer], you can request a clinic appointment, read your test results, renew a prescription or communicate with your care team.     To access your existing account, please contact your [facility] or call [phone] for assistance.        Care EveryWhere ID     This is your Care EveryWhere ID. This could be used by other organizations to access your [facility] medical records  [id number]         Blood Pressure from Last 3 Encounters:   05/23/17 116/74   05/01/17 127/88   10/31/16 111/77    Weight  from Last 3 Encounters:   05/23/17 78.2 kg (172 lb 8 oz)   05/01/17 78.5 kg (173 lb 1.6 oz)   10/31/16 79.4 kg (175 lb)              Today, you had the following     No orders found for display         Today's Medication Changes          These changes are accurate as of: 6/5/17  2:24 PM.  If you have any questions, ask your nurse or doctor.               These medicines have changed or have updated prescriptions.        Dose/Directions    * FLUoxetine 40 MG capsule   Commonly known as:  PROzac   This may have changed:  Another medication with the same name was changed. Make sure you understand how and when to take each.   Used for:  Adjustment disorder with depressed mood        Dose:  40 mg   Take 1 capsule (40 mg) by mouth daily   Quantity:  90 capsule   Refills:  0       * FLUoxetine 10 MG capsule   Commonly known as:  PROzac   This may have changed:    - how much to take  - additional instructions   Used for:  Adjustment disorder with depressed mood        Dose:  10 mg   Take 1 capsule (10 mg) by mouth daily   Quantity:  90 capsule   Refills:  0       * FLUoxetine HCl 60 MG Tabs   This may have changed:  Another medication with the same name was changed. Make sure you understand how and when to take each.   Used for:  Adjustment disorder with depressed mood        Dose:  60 mg   Take 60 mg by mouth daily   Quantity:  90 tablet   Refills:  3       * Notice:  This list has 3 medication(s) that are the same as other medications prescribed for you. Read the directions carefully, and ask your doctor or other care provider to review them with you.             Primary Care Provider Office Phone # Fax #    Marisol Gray [phone]       [address]  [facility] [address]        Thank you!     Thank you for choosing [facility]  for your care. Our goal is always to provide you with excellent care. Hearing back from our patients is one way we  can continue to improve our services. Please take a few minutes to complete the written survey that you may receive in the mail after your visit with us. Thank you!             Your Updated Medication List - Protect others around you: Learn how to safely use, store and throw away your medicines at www.disposemymeds.org.          This list is accurate as of: 6/5/17  2:24 PM.  Always use your most recent med list.                   Brand Name Dispense Instructions for use    * FLUoxetine 40 MG capsule    PROzac    90 capsule    Take 1 capsule (40 mg) by mouth daily       * FLUoxetine 10 MG capsule    PROzac    90 capsule    Take 1 capsule (10 mg) by mouth daily       * FLUoxetine HCl 60 MG Tabs     90 tablet    Take 60 mg by mouth daily       fluticasone 50 MCG/ACT spray    FLONASE     Spray 2 sprays into both nostrils as needed for rhinitis or allergies       norgestimate-ethinyl estradiol 0.25-35 MG-MCG per tablet    ORTHO-CYCLEN, SPRINTEC    84 tablet    Take 1 tablet by mouth daily       sulfamethoxazole-trimethoprim 800-160 MG per tablet    BACTRIM DS/SEPTRA DS    14 tablet    Take 1 tablet by mouth 2 times daily       * Notice:  This list has 3 medication(s) that are the same as other medications prescribed for you. Read the directions carefully, and ask your doctor or other care provider to review them with you.

## 2017-06-05 NOTE — PROGRESS NOTES
Health Psychology                  [facility]    Department of Medicine  Pilar Moe, Ph.D., L.P. [phone]                          Clinic and Surgery Center  [facility] Lenard Kim, Ph.D.,  L.P. [phone]                 Health Psychology - 3rd floor  [facility] Mail Code 748   Zhao Chowdhury, Ph.D., DANAY.DEVENDRA.P.P., L.P. [phone]     [address] [place] [address] Carmela Taylor, Ph.D., L.P. [phone]  [place] [address]       Patient: Elizabeth Miner  Patient's YOB: 1985  MRN: [id number]  Psychologist: Lenard Kim, PhD,   Initial Evaluation Date: 8/5/2016  Treatment Plan Date: 8/5/2016      Health Psychology  Confidential Summary of Psychological Evaluation  Confidential Visit Summary    Service: Individual Psychotherapy     Start time:  12:07  Stop time:  12:55  Treatment modality:   Cognitive Behavioral and Supportive Psychotherapy   Insight Oriented Psychotherapy  Goals and Interventions:   Problem Goals   Mood Disorder  Depression  Anxiety   [x] Decrease symptoms  [x] Improve well being  [x] Improve coping  [] Change behavior/cognitions  [x] Improve psychosocial support  [] Improve decision making  [] Improve self-care with diet and exercise  [] Improve sleep habits/sleep quality  [] Monitor psychological status     Interventions Employed Today   [x] Fostered healthy therapeutic alliance  [] Addressed unhealthy cognitions  [x] Addressed unhealthy behaviors  [] Relaxation training  [] Psycho-education  [] Bibliotherapy  [x] Provided support  [] Explored/confronted resistance  [x] Developed insights into cognitions/behaviors  [x] Identified/Promoted adaptive coping strategies  [] Education/training in sleep   [] Education/training in mindfulness tools     Patient s progress on goals:   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 Discussed her difficulty with assertiveness and  how it impacts her situation and contributes to her anxiety and low mood  Patient's response to treatment:  Engaged, reflective  Plan: Ongoing individual psychotherapy   Current mental health status:   General appearance:  Appropriate, well kept   Mood: anxious, frustrated   Affect: mood congruent    Cognition: Appropriate for age   Orientation: Times three   Insight: fair to good   Memory: Appropriate long term / Short term   Psychosis: Denies   Suicidal / Homicidal Thoughts: Denies      Diagnosis:    Axis I: Major Depressive Disorder, recurrent, in partial remission   Axis II: deferred   Axis III: see EMR   Axis IV: parental divorce, seriously ill family member        Lenard Kim, PhD  Licensed Psychologist  Pager: [phone]

## 2017-06-08 ENCOUNTER — TELEPHONE (OUTPATIENT)
Dept: INTERNAL MEDICINE | Facility: CLINIC | Age: 32
End: 2017-06-08

## 2017-06-21 NOTE — TELEPHONE ENCOUNTER
Prior Authorization Approval    Authorization Effective Date: 6/8/2017  Authorization Expiration Date: 6/8/2018  Medication: Fluoxetine 60mg-APPROVED  Approved Dose/Quantity:   Reference #:     Insurance Company: [employer] - Phone [phone] Fax [phone]  Expected CoPay: Refill too soon     CoPay Card Available:      Foundation Assistance Needed:    Which Pharmacy is filling the prescription (Not needed for infusion/clinic administered): [id number] [place] [facility] [place], [place] - [address] [facility]  Pharmacy Notified: Yes  Patient Notified: Yes

## 2017-06-21 NOTE — TELEPHONE ENCOUNTER
PA Initiation    Medication: Fluoxetine 60mg  Insurance Company: [employer] - Phone [phone] Fax [phone]  Pharmacy Filling the Rx[id number] - [place], [place] - [address] [place] [address]  Filling Pharmacy Phone: [phone]  Filling Pharmacy Fax:    Start Date: 6/21/2017

## 2017-07-13 ENCOUNTER — OFFICE VISIT (OUTPATIENT)
Dept: PSYCHOLOGY | Facility: CLINIC | Age: 32
End: 2017-07-13

## 2017-07-13 DIAGNOSIS — F33.41 MAJOR DEPRESSIVE DISORDER, RECURRENT EPISODE, IN PARTIAL REMISSION WITH ANXIOUS DISTRESS (H): Primary | ICD-10-CM

## 2017-07-13 NOTE — MR AVS SNAPSHOT
After Visit Summary   7/13/2017    Elizabeth Miner    MRN: [id number]           Patient Information     Date Of Birth          1985        Visit Information        Provider Department      7/13/2017 4:00 PM Lenard Kim, PhD [facility]        Today's Diagnoses     Major depressive disorder, recurrent episode, in partial remission with anxious distress (H)    -  1       Follow-ups after your visit        Your next 10 appointments already scheduled     Sep 08, 2017  1:00 PM CDT   (Arrive by 12:45 PM)   Return Visit with Lenard Kim, PhD DOMIN[facility]   [facility] ([facility])    [address]  3rd [facility] [address] [phone]              Who to contact     Please call your clinic at [phone] to:    Ask questions about your health    Make or cancel appointments    Discuss your medicines    Learn about your test results    Speak to your doctor   If you have compliments or concerns about an experience at your clinic, or if you wish to file a complaint, please contact [facility] Patient Relations at [phone] or email us at [email or web address]         Additional Information About Your Visit        MyChart Information     [employer] gives you secure access to your electronic health record. If you see a primary care provider, you can also send messages to your care team and make appointments. If you have questions, please call your primary care clinic.  If you do not have a primary care provider, please call [phone] and they will assist you.      [employer] is an electronic gateway that provides easy, online access to your medical records. With [employer], you can request a clinic appointment, read your test results, renew a prescription or communicate with your care team.     To access your existing account, please contact your [facility] or  call [phone] for assistance.        Care EveryWhere ID     This is your Care EveryWhere ID. This could be used by other organizations to access your [facility] medical records  [id number]         Blood Pressure from Last 3 Encounters:   05/23/17 116/74   05/01/17 127/88   10/31/16 111/77    Weight from Last 3 Encounters:   05/23/17 78.2 kg (172 lb 8 oz)   05/01/17 78.5 kg (173 lb 1.6 oz)   10/31/16 79.4 kg (175 lb)              Today, you had the following     No orders found for display         Today's Medication Changes          These changes are accurate as of: 7/13/17 11:59 PM.  If you have any questions, ask your nurse or doctor.               These medicines have changed or have updated prescriptions.        Dose/Directions    * FLUoxetine 40 MG capsule   Commonly known as:  PROzac   This may have changed:  Another medication with the same name was changed. Make sure you understand how and when to take each.   Used for:  Adjustment disorder with depressed mood        Dose:  40 mg   Take 1 capsule (40 mg) by mouth daily   Quantity:  90 capsule   Refills:  0       * FLUoxetine 10 MG capsule   Commonly known as:  PROzac   This may have changed:    - how much to take  - additional instructions   Used for:  Adjustment disorder with depressed mood        Dose:  10 mg   Take 1 capsule (10 mg) by mouth daily   Quantity:  90 capsule   Refills:  0       * FLUoxetine HCl 60 MG Tabs   This may have changed:  Another medication with the same name was changed. Make sure you understand how and when to take each.   Used for:  Adjustment disorder with depressed mood        Dose:  60 mg   Take 60 mg by mouth daily   Quantity:  90 tablet   Refills:  3       * Notice:  This list has 3 medication(s) that are the same as other medications prescribed for you. Read the directions carefully, and ask your doctor or other care provider to review them with you.             Primary Care Provider Office Phone # Fax #    Marisol Aaron  Matthew Gray [phone]       [address] [place] [address] [address]  [facility] [address]        Equal Access to Services     GUNJAN VANEGAS : Hadii aad ku hadolivierhaleigh Alleyluke, walavelle fletcherandrewha, rimma luanabri magaña, jennifer katiein hayaan jessimay cadena brenda beckford. So [facility] [phone].    ATENCIÓN: Si habla español, tiene a calles disposición servicios gratuitos de asistencia lingüística. Llame al [phone].    We comply with applicable federal civil rights laws and [place] laws. We do not discriminate on the basis of race, color, national origin, age, disability sex, sexual orientation or gender identity.            Thank you!     Thank you for choosing [facility]  for your care. Our goal is always to provide you with excellent care. Hearing back from our patients is one way we can continue to improve our services. Please take a few minutes to complete the written survey that you may receive in the mail after your visit with us. Thank you!             Your Updated Medication List - Protect others around you: Learn how to safely use, store and throw away your medicines at www.disposemymeds.org.          This list is accurate as of: 7/13/17 11:59 PM.  Always use your most recent med list.                   Brand Name Dispense Instructions for use Diagnosis    * FLUoxetine 40 MG capsule    PROzac    90 capsule    Take 1 capsule (40 mg) by mouth daily    Adjustment disorder with depressed mood       * FLUoxetine 10 MG capsule    PROzac    90 capsule    Take 1 capsule (10 mg) by mouth daily    Adjustment disorder with depressed mood       * FLUoxetine HCl 60 MG Tabs     90 tablet    Take 60 mg by mouth daily    Adjustment disorder with depressed mood       fluticasone 50 MCG/ACT spray    FLONASE     Spray 2 sprays into both nostrils as needed for rhinitis or allergies        norgestimate-ethinyl estradiol 0.25-35 MG-MCG per tablet    ORTHO-CYCLEN, SPRINTEC    84 tablet    Take 1 tablet by mouth  daily    Encounter for initial prescription of contraceptive pills       sulfamethoxazole-trimethoprim 800-160 MG per tablet    BACTRIM DS/SEPTRA DS    14 tablet    Take 1 tablet by mouth 2 times daily    Cellulitis of other specified site       * Notice:  This list has 3 medication(s) that are the same as other medications prescribed for you. Read the directions carefully, and ask your doctor or other care provider to review them with you.

## 2017-07-24 ENCOUNTER — OFFICE VISIT (OUTPATIENT)
Dept: PSYCHOLOGY | Facility: CLINIC | Age: 32
End: 2017-07-24

## 2017-07-24 DIAGNOSIS — F33.41 MAJOR DEPRESSIVE DISORDER, RECURRENT EPISODE, IN PARTIAL REMISSION WITH ANXIOUS DISTRESS (H): Primary | ICD-10-CM

## 2017-07-24 NOTE — PROGRESS NOTES
Health Psychology                  [facility]    Department of Medicine  Pilar Moe, Ph.D., L.P. [phone]                          Clinic and Surgery Center  [facility] Lenard Kim, Ph.D.,  L.P. [phone]                 Health Psychology - 3rd [facility] Mail Code 747   Zhao Chowdhury, Ph.D., A.B.P.P., L.P. [phone]     [address] [place] [address] Carmela Taylor, Ph.D., L.P. [phone]  [place] [address]       Patient: Elizabeth Miner  Patient's YOB: 1985  MRN: [id number]  Psychologist: Lenard Kim, PhD,   Initial Evaluation Date: 8/5/2016  Treatment Plan Date: 8/5/2016      Health Psychology  Confidential Summary of Psychological Evaluation  Confidential Visit Summary    Service: Individual Psychotherapy     Start time:  12:03  Stop time:  1:00  Treatment modality:   Cognitive Behavioral and Supportive Psychotherapy   Insight Oriented Psychotherapy  Goals and Interventions:   Problem Goals   Mood Disorder  Depression  Anxiety   [x] Decrease symptoms  [x] Improve well being  [x] Improve coping  [] Change behavior/cognitions  [x] Improve psychosocial support  [] Improve decision making  [] Improve self-care with diet and exercise  [] Improve sleep habits/sleep quality  [] Monitor psychological status     Interventions Employed Today   [x] Fostered healthy therapeutic alliance  [] Addressed unhealthy cognitions  [x] Addressed unhealthy behaviors  [] Relaxation training  [] Psycho-education  [] Bibliotherapy  [x] Provided support  [] Explored/confronted resistance  [x] Developed insights into cognitions/behaviors  [x] Identified/Promoted adaptive coping strategies  [] Education/training in sleep   [] Education/training in mindfulness tools     Patient s progress on goals:   Discussed issues of grief since her brother's death and how she is coping.  Discussed her habits of compartmentalizing and focusing on  organization and distraction.  She shares little of what is going on with others and work and only somewhat more with friends.  Discussed alternatives to getting more support from others.  Discussed ways in which this has highlighted her father's lack of effort, skill in making connections and offering/seeking emotional support.    Patient's response to treatment:  Engaged, reflective  Plan: Ongoing individual psychotherapy   Current mental health status:   General appearance:  Appropriate, well kept   Mood: grieving, sad   Affect: mood congruent    Cognition: Appropriate for age   Orientation: Times three   Insight: fair to good   Memory: Appropriate long term / Short term   Psychosis: Denies   Suicidal / Homicidal Thoughts: Denies      Diagnosis:    Axis I: Major Depressive Disorder, recurrent, in partial remission   Axis II: deferred   Axis III: see EMR   Axis IV: parental divorce, seriously ill family member        Lenard Kim, PhD  Licensed Psychologist  Pager: [phone]

## 2017-07-24 NOTE — MR AVS SNAPSHOT
After Visit Summary   7/24/2017    Elizabeth Miner    MRN: [id number]           Patient Information     Date Of Birth          1985        Visit Information        Provider Department      7/24/2017 12:00 PM Lenard Kim, PhD [facility]        Today's Diagnoses     Major depressive disorder, recurrent episode, in partial remission with anxious distress (H)    -  1       Follow-ups after your visit        Who to contact     Please call your clinic at [phone] to:    Ask questions about your health    Make or cancel appointments    Discuss your medicines    Learn about your test results    Speak to your doctor   If you have compliments or concerns about an experience at your clinic, or if you wish to file a complaint, please contact [facility] Patient Relations at [phone] or email us at [email or web address]         Additional Information About Your Visit        MyChart Information     [employer] gives you secure access to your electronic health record. If you see a primary care provider, you can also send messages to your care team and make appointments. If you have questions, please call your primary care clinic.  If you do not have a primary care provider, please call [phone] and they will assist you.      [employer] is an electronic gateway that provides easy, online access to your medical records. With [employer], you can request a clinic appointment, read your test results, renew a prescription or communicate with your care team.     To access your existing account, please contact your [facility] or call [phone] for assistance.        Care EveryWhere ID     This is your Care EveryWhere ID. This could be used by other organizations to access your [place] medical records  [id number]         Blood Pressure from Last 3 Encounters:   05/23/17 116/74   05/01/17 127/88   10/31/16 111/77    Weight  from Last 3 Encounters:   05/23/17 78.2 kg (172 lb 8 oz)   05/01/17 78.5 kg (173 lb 1.6 oz)   10/31/16 79.4 kg (175 lb)              Today, you had the following     No orders found for display         Today's Medication Changes          These changes are accurate as of: 7/24/17 11:59 PM.  If you have any questions, ask your nurse or doctor.               These medicines have changed or have updated prescriptions.        Dose/Directions    * FLUoxetine 40 MG capsule   Commonly known as:  PROzac   This may have changed:  Another medication with the same name was changed. Make sure you understand how and when to take each.   Used for:  Adjustment disorder with depressed mood        Dose:  40 mg   Take 1 capsule (40 mg) by mouth daily   Quantity:  90 capsule   Refills:  0       * FLUoxetine 10 MG capsule   Commonly known as:  PROzac   This may have changed:    - how much to take  - additional instructions   Used for:  Adjustment disorder with depressed mood        Dose:  10 mg   Take 1 capsule (10 mg) by mouth daily   Quantity:  90 capsule   Refills:  0       * FLUoxetine HCl 60 MG Tabs   This may have changed:  Another medication with the same name was changed. Make sure you understand how and when to take each.   Used for:  Adjustment disorder with depressed mood        Dose:  60 mg   Take 60 mg by mouth daily   Quantity:  90 tablet   Refills:  3       * Notice:  This list has 3 medication(s) that are the same as other medications prescribed for you. Read the directions carefully, and ask your doctor or other care provider to review them with you.             Primary Care Provider Office Phone # Fax #    Marisol Gray [phone]       [address]  [facility] [address]        Equal Access to Services     GUNJAN VANEGAS AH: Nidia Yan, luis eduardo casarez, jennifer bonilla. So [facility]  [phone].    ATENCIÓN: Si eduard allred, tiene a calles disposición servicios gratuitos de asistencia lingüística. Rabia sanchez [phone].    We comply with applicable federal civil rights laws and [place] laws. We do not discriminate on the basis of race, color, national origin, age, disability sex, sexual orientation or gender identity.            Thank you!     Thank you for choosing [facility]  for your care. Our goal is always to provide you with excellent care. Hearing back from our patients is one way we can continue to improve our services. Please take a few minutes to complete the written survey that you may receive in the mail after your visit with us. Thank you!             Your Updated Medication List - Protect others around you: Learn how to safely use, store and throw away your medicines at www.disposemymeds.org.          This list is accurate as of: 7/24/17 11:59 PM.  Always use your most recent med list.                   Brand Name Dispense Instructions for use Diagnosis    * FLUoxetine 40 MG capsule    PROzac    90 capsule    Take 1 capsule (40 mg) by mouth daily    Adjustment disorder with depressed mood       * FLUoxetine 10 MG capsule    PROzac    90 capsule    Take 1 capsule (10 mg) by mouth daily    Adjustment disorder with depressed mood       * FLUoxetine HCl 60 MG Tabs     90 tablet    Take 60 mg by mouth daily    Adjustment disorder with depressed mood       fluticasone 50 MCG/ACT spray    FLONASE     Spray 2 sprays into both nostrils as needed for rhinitis or allergies        norgestimate-ethinyl estradiol 0.25-35 MG-MCG per tablet    ORTHO-CYCLEN, SPRINTEC    84 tablet    Take 1 tablet by mouth daily    Encounter for initial prescription of contraceptive pills       sulfamethoxazole-trimethoprim 800-160 MG per tablet    BACTRIM DS/SEPTRA DS    14 tablet    Take 1 tablet by mouth 2 times daily    Cellulitis of other specified site       * Notice:  This list has 3  medication(s) that are the same as other medications prescribed for you. Read the directions carefully, and ask your doctor or other care provider to review them with you.

## 2017-08-14 ENCOUNTER — OFFICE VISIT (OUTPATIENT)
Dept: PSYCHOLOGY | Facility: CLINIC | Age: 32
End: 2017-08-14

## 2017-08-14 DIAGNOSIS — F33.41 MAJOR DEPRESSIVE DISORDER, RECURRENT EPISODE, IN PARTIAL REMISSION WITH ANXIOUS DISTRESS (H): Primary | ICD-10-CM

## 2017-08-14 NOTE — MR AVS SNAPSHOT
After Visit Summary   8/14/2017    Elizabeth Miner    MRN: [id number]           Patient Information     Date Of Birth          1985        Visit Information        Provider Department      8/14/2017 3:00 PM Lenard Kim, PhD [facility]        Today's Diagnoses     Major depressive disorder, recurrent episode, in partial remission with anxious distress (H)    -  1       Follow-ups after your visit        Your next 10 appointments already scheduled     Sep 29, 2017 10:00 AM CDT   (Arrive by 9:45 AM)   Return Visit with Lenard Kim, PhD [facility]   [facility] ([facility])    [address] [place] [id number] [phone]            Oct 13, 2017  2:00 PM CDT   (Arrive by 1:45 PM)   Return Visit with Lenard Kim, PhD [facility]   [facility] ([facility])    [address] [place] [address] [id number] [phone]            Oct 27, 2017  2:00 PM CDT   (Arrive by 1:45 PM)   Return Visit with Lenard Kim, PhD [facility]   [facility] ([facility])    [address] [place] [address] [id number] [phone]              Who to contact     Please call your clinic at [phone] to:    Ask questions about your health    Make or cancel appointments    Discuss your medicines    Learn about your test results    Speak to your doctor   If you have compliments or concerns about an experience at your clinic, or if you wish to file a complaint, please contact [facility] Patient Relations at [phone] or email us at [email or web address]         Additional Information About Your Visit        MyChart Information     MyChart gives you secure access to your electronic health record. If you see a primary care provider, you can also send messages to your care team and make  appointments. If you have questions, please call your primary care clinic.  If you do not have a primary care provider, please call [phone] and they will assist you.      [employer] is an electronic gateway that provides easy, online access to your medical records. With [employer], you can request a clinic appointment, read your test results, renew a prescription or communicate with your care team.     To access your existing account, please contact your [facility] or call [phone] for assistance.        Care EveryWhere ID     This is your Care EveryWhere ID. This could be used by other organizations to access your [place] medical records  [id number]         Blood Pressure from Last 3 Encounters:   05/23/17 116/74   05/01/17 127/88   10/31/16 111/77    Weight from Last 3 Encounters:   05/23/17 78.2 kg (172 lb 8 oz)   05/01/17 78.5 kg (173 lb 1.6 oz)   10/31/16 79.4 kg (175 lb)              Today, you had the following     No orders found for display         Today's Medication Changes          These changes are accurate as of: 8/14/17 11:59 PM.  If you have any questions, ask your nurse or doctor.               These medicines have changed or have updated prescriptions.        Dose/Directions    * FLUoxetine 40 MG capsule   Commonly known as:  PROzac   This may have changed:  Another medication with the same name was changed. Make sure you understand how and when to take each.   Used for:  Adjustment disorder with depressed mood        Dose:  40 mg   Take 1 capsule (40 mg) by mouth daily   Quantity:  90 capsule   Refills:  0       * FLUoxetine 10 MG capsule   Commonly known as:  PROzac   This may have changed:    - how much to take  - additional instructions   Used for:  Adjustment disorder with depressed mood        Dose:  10 mg   Take 1 capsule (10 mg) by mouth daily   Quantity:  90 capsule   Refills:  0       * FLUoxetine HCl 60 MG Tabs   This may have changed:  Another  medication with the same name was changed. Make sure you understand how and when to take each.   Used for:  Adjustment disorder with depressed mood        Dose:  60 mg   Take 60 mg by mouth daily   Quantity:  90 tablet   Refills:  3       * Notice:  This list has 3 medication(s) that are the same as other medications prescribed for you. Read the directions carefully, and ask your doctor or other care provider to review them with you.             Primary Care Provider Office Phone # Fax #    Marisol Agnieszka Gray [phone]       0 [facility] [address] [place] [address]        Equal Access to Services     [facility]: Hadii aad ku hadasho Soomaali, waaxda luqadaha, qaybta kaalmada ademayyamyriam, jennifer gutierrez . So [facility] [phone].    ATENCIÓN: Si habla español, tiene a calles disposición servicios gratuitos de asistencia lingüística. [facility] [phone].    We comply with applicable federal civil rights laws and [place] laws. We do not discriminate on the basis of race, color, national origin, age, disability sex, sexual orientation or gender identity.            Thank you!     Thank you for choosing [facility]  for your care. Our goal is always to provide you with excellent care. Hearing back from our patients is one way we can continue to improve our services. Please take a few minutes to complete the written survey that you may receive in the mail after your visit with us. Thank you!             Your Updated Medication List - Protect others around you: Learn how to safely use, store and throw away your medicines at www.disposemymeds.org.          This list is accurate as of: 8/14/17 11:59 PM.  Always use your most recent med list.                   Brand Name Dispense Instructions for use Diagnosis    * FLUoxetine 40 MG capsule    PROzac    90 capsule    Take 1 capsule (40 mg) by mouth daily    Adjustment disorder with depressed mood        * FLUoxetine 10 MG capsule    PROzac    90 capsule    Take 1 capsule (10 mg) by mouth daily    Adjustment disorder with depressed mood       * FLUoxetine HCl 60 MG Tabs     90 tablet    Take 60 mg by mouth daily    Adjustment disorder with depressed mood       fluticasone 50 MCG/ACT spray    FLONASE     Spray 2 sprays into both nostrils as needed for rhinitis or allergies        norgestimate-ethinyl estradiol 0.25-35 MG-MCG per tablet    ORTHO-CYCLEN, SPRINTEC    84 tablet    Take 1 tablet by mouth daily    Encounter for initial prescription of contraceptive pills       sulfamethoxazole-trimethoprim 800-160 MG per tablet    BACTRIM DS/SEPTRA DS    14 tablet    Take 1 tablet by mouth 2 times daily    Cellulitis of other specified site       * Notice:  This list has 3 medication(s) that are the same as other medications prescribed for you. Read the directions carefully, and ask your doctor or other care provider to review them with you.

## 2017-08-18 NOTE — PROGRESS NOTES
Health Psychology                  [facility]    Department of Medicine  Pilar Moe, Ph.D., L.P. [phone]                          Clinic and Surgery Center  [facility] Lenard Kim, Ph.D.,  L.P. [phone]                 Health Psychology  3[address] [facility] Mail Code 748   Zhao Chowdhury, Ph.D., A.B.P.P., L.P. [phone]     [address] [place] [address] Carmela Taylor, Ph.D., L.P. [phone]  [place] [address]       Patient: Elizabeth Miner  Patient's YOB: 1985  MRN: [id number]  Psychologist: Lenard Kim, PhD,   Initial Evaluation Date: 8/5/2016  Treatment Plan Date: Aug 14, 2017        Health Psychology  Confidential Summary of Psychological Evaluation  Confidential Visit Summary    Service: Individual Psychotherapy     Start time:  3:05  Stop time:  4:00  Treatment modality:   Cognitive Behavioral and Supportive Psychotherapy   Insight Oriented Psychotherapy  Goals and Interventions:   Problem Goals   Mood Disorder  Depression  Anxiety   [x] Decrease symptoms  [x] Improve well being  [x] Improve coping  [] Change behavior/cognitions  [x] Improve psychosocial support  [] Improve decision making  [] Improve self-care with diet and exercise  [] Improve sleep habits/sleep quality  [] Monitor psychological status     Interventions Employed Today   [x] Fostered healthy therapeutic alliance  [] Addressed unhealthy cognitions  [x] Addressed unhealthy behaviors  [] Relaxation training  [] Psycho-education  [] Bibliotherapy  [x] Provided support  [] Explored/confronted resistance  [x] Developed insights into cognitions/behaviors  [x] Identified/Promoted adaptive coping strategies  [] Education/training in sleep   [] Education/training in mindfulness tools     Patient s progress on goals:   Discussed ongoing issues of grief related to the amount of energy and focus her brother's struggle with cancer took up from the whole  family.  Discussed missed celebrations and ways in which Elizabeth's life faded into the background because of his struggle.  Discussed her impulse to focus on her mother's needs and the impacts on her of his illness.    Patient's response to treatment:  Engaged, reflective  Plan: Ongoing individual psychotherapy   Current mental health status:   General appearance:  Appropriate, well kept   Mood: sad   Affect: mood congruent    Cognition: Appropriate for age   Orientation: Times three   Insight: fair to good   Memory: Appropriate long term / Short term   Psychosis: Denies   Suicidal / Homicidal Thoughts: Denies      Diagnosis:    Axis I: Major Depressive Disorder, recurrent, in partial remission   Axis II: deferred   Axis III: see EMR   Axis IV: parental divorce, seriously ill family member        Lenard Kim, PhD  Licensed Psychologist  Pager: [phone]

## 2017-08-21 NOTE — PROGRESS NOTES
Health Psychology                  [facility]    Department of Medicine  Pilar Moe, Ph.D., L.P. [phone]                          Clinic and Surgery Center  [facility] Lenard Kim, Ph.D.,  L.P. [phone]                 Health Psychology  3rd [facility] Mail Code 746   Zhao Chowdhury, Ph.D., DANAY.DEVENDRA.P.P., L.P. [phone]     [address] [place] [address] Carmela Taylor, Ph.D., L.P. [phone]  [place] [address]       Patient: Elizabeth Miner  Patient's YOB: 1985  MRN: [id number]  Psychologist: Lenard Kim, PhD,   Initial Evaluation Date: 2016  Treatment Plan Date: 2016      Health Psychology  Confidential Summary of Psychological Evaluation  Confidential Visit Summary    Service: Individual Psychotherapy     Start time:  4:07  Stop time:  5:05  Treatment modality:   Cognitive Behavioral and Supportive Psychotherapy   Insight Oriented Psychotherapy  Goals and Interventions:   Problem Goals   Mood Disorder  Depression  Anxiety   [x] Decrease symptoms  [x] Improve well being  [x] Improve coping  [] Change behavior/cognitions  [x] Improve psychosocial support  [] Improve decision making  [] Improve self-care with diet and exercise  [] Improve sleep habits/sleep quality  [] Monitor psychological status     Interventions Employed Today   [x] Fostered healthy therapeutic alliance  [] Addressed unhealthy cognitions  [x] Addressed unhealthy behaviors  [] Relaxation training  [] Psycho-education  [] Bibliotherapy  [x] Provided support  [] Explored/confronted resistance  [x] Developed insights into cognitions/behaviors  [x] Identified/Promoted adaptive coping strategies  [] Education/training in sleep   [] Education/training in mindfulness tools     Patient s progress on goals:   Elizabeth's brother  2 weeks ago.  Discussed/processed his death and the challenging family dynamics associated with it.  Discussed her own  challenges with self-care and ways in which she has been coping with her grief and thoughts/memories that have come up since then.  Discussed old habits she has acquired surrounding coping that limit her access to her feelings and needs.    Patient's response to treatment:  Engaged, motivated, working toward being more in touch and comfortable with her feelings.   Plan: Ongoing individual psychotherapy   Current mental health status:   General appearance:  Appropriate, well kept   Mood: anxious, frustrated   Affect: depressed, fatigue   Cognition: Appropriate for age   Orientation: Times three   Insight: fair to good   Memory: Appropriate long term / Short term   Psychosis: Denies   Suicidal / Homicidal Thoughts: Denies      Diagnosis:    Axis I: Major Depressive Disorder, recurrent, in partial remission   Axis II: deferred   Axis III: see EMR   Axis IV: parental divorce, seriously ill family member        Lenard Kim, PhD  Licensed Psychologist  Pager: [phone]

## 2017-09-08 ENCOUNTER — OFFICE VISIT (OUTPATIENT)
Dept: PSYCHOLOGY | Facility: CLINIC | Age: 32
End: 2017-09-08

## 2017-09-08 DIAGNOSIS — F33.41 MAJOR DEPRESSIVE DISORDER, RECURRENT EPISODE, IN PARTIAL REMISSION WITH ANXIOUS DISTRESS (H): Primary | ICD-10-CM

## 2017-09-08 NOTE — MR AVS SNAPSHOT
After Visit Summary   9/8/2017    Elizabeth Miner    MRN: [id number]           Patient Information     Date Of Birth          1985        Visit Information        Provider Department      9/8/2017 1:00 PM Lenard Kim, PhD DOMINIC [facility]        Today's Diagnoses     Major depressive disorder, recurrent episode, in partial remission with anxious distress (H)    -  1       Follow-ups after your visit        Your next 10 appointments already scheduled     Oct 13, 2017  2:00 PM CDT   (Arrive by 1:45 PM)   Return Visit with Lenard Kim, PhD DOMINIC   [facility] ([facility])    [address] [place] [address] [phone]            Oct 27, 2017  2:00 PM CDT   (Arrive by 1:45 PM)   Return Visit with Lenard Kim, PhD ALFARO   [facility] ([facility])    [address] [place] [address] [phone]              Who to contact     Please call your clinic at [phone] to:    Ask questions about your health    Make or cancel appointments    Discuss your medicines    Learn about your test results    Speak to your doctor   If you have compliments or concerns about an experience at your clinic, or if you wish to file a complaint, please contact [facility] Patient Relations at [phone] or email us at [email or web address]         Additional Information About Your Visit        MyChart Information     [employer] gives you secure access to your electronic health record. If you see a primary care provider, you can also send messages to your care team and make appointments. If you have questions, please call your primary care clinic.  If you do not have a primary care provider, please call [phone] and they will assist you.      [employer] is an electronic gateway that provides easy, online access to your  medical records. With [employer], you can request a clinic appointment, read your test results, renew a prescription or communicate with your care team.     To access your existing account, please contact your [facility] or call [phone] for assistance.        Care EveryWhere ID     This is your Care EveryWhere ID. This could be used by other organizations to access your [place] medical records  [id number]         Blood Pressure from Last 3 Encounters:   05/23/17 116/74   05/01/17 127/88   10/31/16 111/77    Weight from Last 3 Encounters:   05/23/17 78.2 kg (172 lb 8 oz)   05/01/17 78.5 kg (173 lb 1.6 oz)   10/31/16 79.4 kg (175 lb)              Today, you had the following     No orders found for display         Today's Medication Changes          These changes are accurate as of: 9/8/17 11:59 PM.  If you have any questions, ask your nurse or doctor.               These medicines have changed or have updated prescriptions.        Dose/Directions    * FLUoxetine 40 MG capsule   Commonly known as:  PROzac   This may have changed:  Another medication with the same name was changed. Make sure you understand how and when to take each.   Used for:  Adjustment disorder with depressed mood        Dose:  40 mg   Take 1 capsule (40 mg) by mouth daily   Quantity:  90 capsule   Refills:  0       * FLUoxetine 10 MG capsule   Commonly known as:  PROzac   This may have changed:    - how much to take  - additional instructions   Used for:  Adjustment disorder with depressed mood        Dose:  10 mg   Take 1 capsule (10 mg) by mouth daily   Quantity:  90 capsule   Refills:  0       * FLUoxetine HCl 60 MG Tabs   This may have changed:  Another medication with the same name was changed. Make sure you understand how and when to take each.   Used for:  Adjustment disorder with depressed mood        Dose:  60 mg   Take 60 mg by mouth daily   Quantity:  90 tablet   Refills:  3       * Notice:  This list  has 3 medication(s) that are the same as other medications prescribed for you. Read the directions carefully, and ask your doctor or other care provider to review them with you.             Primary Care Provider Office Phone # Fax Elisa    Marisol Agnieszka Gray [phone] [address] [address]        Equal Access to Services     [facility]: Hadii aad ku hadasho Soomaali, waaxda luqadaha, qaybta kaalmada adeegyada, waxay idiin hayaan adeeg kharash la'aan ah. So [facility] [phone].    ATENCIÓN: Si habla español, tiene a calles disposición servicios gratuitos de asistencia lingüística. [facility] al [phone].    We comply with applicable federal civil rights laws and [place] laws. We do not discriminate on the basis of race, color, national origin, age, disability, sex, sexual orientation, or gender identity.            Thank you!     Thank you for choosing [facility]  for your care. Our goal is always to provide you with excellent care. Hearing back from our patients is one way we can continue to improve our services. Please take a few minutes to complete the written survey that you may receive in the mail after your visit with us. Thank you!             Your Updated Medication List - Protect others around you: Learn how to safely use, store and throw away your medicines at www.disposemymeds.org.          This list is accurate as of: 9/8/17 11:59 PM.  Always use your most recent med list.                   Brand Name Dispense Instructions for use Diagnosis    * FLUoxetine 40 MG capsule    PROzac    90 capsule    Take 1 capsule (40 mg) by mouth daily    Adjustment disorder with depressed mood       * FLUoxetine 10 MG capsule    PROzac    90 capsule    Take 1 capsule (10 mg) by mouth daily    Adjustment disorder with depressed mood       * FLUoxetine HCl 60 MG Tabs     90 tablet    Take 60 mg by mouth daily    Adjustment disorder with depressed mood        fluticasone 50 MCG/ACT spray    FLONASE     Spray 2 sprays into both nostrils as needed for rhinitis or allergies        norgestimate-ethinyl estradiol 0.25-35 MG-MCG per tablet    ORTHO-CYCLEN, SPRINTEC    84 tablet    Take 1 tablet by mouth daily    Encounter for initial prescription of contraceptive pills       sulfamethoxazole-trimethoprim 800-160 MG per tablet    BACTRIM DS/SEPTRA DS    14 tablet    Take 1 tablet by mouth 2 times daily    Cellulitis of other specified site       * Notice:  This list has 3 medication(s) that are the same as other medications prescribed for you. Read the directions carefully, and ask your doctor or other care provider to review them with you.

## 2017-09-29 ENCOUNTER — OFFICE VISIT (OUTPATIENT)
Dept: PSYCHOLOGY | Facility: CLINIC | Age: 32
End: 2017-09-29

## 2017-09-29 DIAGNOSIS — F33.41 MAJOR DEPRESSIVE DISORDER, RECURRENT EPISODE, IN PARTIAL REMISSION WITH ANXIOUS DISTRESS (H): Primary | ICD-10-CM

## 2017-09-29 NOTE — PROGRESS NOTES
Health Psychology                  [facility]    Department of Medicine  Pilar Moe, Ph.D., L.P. [phone]                          Clinic and Surgery Center  [facility] Lenard Kim, Ph.D.,  L.P. [phone]                 Health Psychology  3rd [facility] Mail Code 746   Zhao Chowdhury, Ph.D., A.B.P.P., L.P. [phone]     [address] [place] [address] Carmela Taylor, Ph.D., L.P. [phone]  [place] [address]       Patient: Elizabeth Miner  Patient's YOB: 1985  MRN: [id number]  Psychologist: Lenard Kim, PhD,   Initial Evaluation Date: 8/5/2016  Treatment Plan Date: Aug 14, 2017        Health Psychology  Confidential Summary of Psychological Evaluation  Confidential Visit Summary    Service: Individual Psychotherapy     Start time:  10:04  Stop time:  11:02  Treatment modality:   Cognitive Behavioral and Supportive Psychotherapy   Insight Oriented Psychotherapy  Goals and Interventions:   Problem Goals   Mood Disorder  Depression  Anxiety   [x] Decrease symptoms  [x] Improve well being  [x] Improve coping  [] Change behavior/cognitions  [x] Improve psychosocial support  [] Improve decision making  [] Improve self-care with diet and exercise  [] Improve sleep habits/sleep quality  [] Monitor psychological status     Interventions Employed Today   [x] Fostered healthy therapeutic alliance  [] Addressed unhealthy cognitions  [x] Addressed unhealthy behaviors  [] Relaxation training  [] Psycho-education  [] Bibliotherapy  [x] Provided support  [] Explored/confronted resistance  [x] Developed insights into cognitions/behaviors  [x] Identified/Promoted adaptive coping strategies  [] Education/training in sleep   [] Education/training in mindfulness tools     Patient s progress on goals:   Discussed issues of communication and work.  Desire to find another position (making some progress in her search) and feeling  frustrated/anxious about current working situation.  Discussed challenges and how she can better cope and manage the situation.  Discussed self-care.   Patient's response to treatment:  Engaged, reflective  Plan: Ongoing individual psychotherapy   Current mental health status:   General appearance:  Appropriate, well kept   Mood: anxious/frustrated   Affect: mood congruent    Cognition: Appropriate for age   Orientation: Times three   Insight: fair to good   Memory: Appropriate long term / Short term   Psychosis: Denies   Suicidal / Homicidal Thoughts: Denies      Diagnosis:    Axis I: Major Depressive Disorder, recurrent, in partial remission   Axis II: deferred   Axis III: see EMR   Axis IV: parental divorce, seriously ill family member        Lenard Kim, PhD  Licensed Psychologist  Pager: [phone]

## 2017-10-11 NOTE — PROGRESS NOTES
Health Psychology                  [facility]    Department of Medicine  Pilar Moe, Ph.D., L.P. [phone]                          Clinic and Surgery Center  [facility] Lenard Kim, Ph.D.,  L.P. [phone]                 Health Psychology  3rd floor  [place] Mail Code 74   Zhao Chowdhury, Ph.D., A.B.P.P., L.P. [phone]     [address] [place] [address] Carmela Taylor, Ph.D., L.P. [phone]  [place] [address]       Patient: Elizabeth Miner  Patient's YOB: 1985  MRN: [id number]  Psychologist: Lenard Kim, PhD,   Initial Evaluation Date: 8/5/2016  Treatment Plan Date: Aug 14, 2017        Health Psychology  Confidential Summary of Psychological Evaluation  Confidential Visit Summary    Service: Individual Psychotherapy     Start time:  1:02  Stop time:  2:00  Treatment modality:   Cognitive Behavioral and Supportive Psychotherapy   Insight Oriented Psychotherapy  Goals and Interventions:   Problem Goals   Mood Disorder  Depression  Anxiety   [x] Decrease symptoms  [x] Improve well being  [x] Improve coping  [] Change behavior/cognitions  [x] Improve psychosocial support  [] Improve decision making  [] Improve self-care with diet and exercise  [] Improve sleep habits/sleep quality  [] Monitor psychological status     Interventions Employed Today   [x] Fostered healthy therapeutic alliance  [] Addressed unhealthy cognitions  [x] Addressed unhealthy behaviors  [] Relaxation training  [] Psycho-education  [] Bibliotherapy  [x] Provided support  [] Explored/confronted resistance  [x] Developed insights into cognitions/behaviors  [x] Identified/Promoted adaptive coping strategies  [] Education/training in sleep   [] Education/training in mindfulness tools     Patient s progress on goals:   Discussed issues pertaining to self-care and family dynamics.  Discussed role in family and ways in which her successes meant less  attention with her sister's intellectual/developmental challenges and her brother's cancer.    Discussed boundaries and self-care.  Discussed challenges she has with talking to others about events in her life and her feelings.   Patient's response to treatment:  Engaged, reflective  Plan: Ongoing individual psychotherapy   Current mental health status:   General appearance:  Appropriate, well kept   Mood: sad, anxious   Affect: mood congruent    Cognition: Appropriate for age   Orientation: Times three   Insight: fair to good   Memory: Appropriate long term / Short term   Psychosis: Denies   Suicidal / Homicidal Thoughts: Denies      Diagnosis:    Axis I: Major Depressive Disorder, recurrent, in partial remission   Axis II: deferred   Axis III: see EMR   Axis IV: parental divorce, seriously ill family member        Lenard Kim, PhD  Licensed Psychologist  Pager: [phone]

## 2017-10-13 ENCOUNTER — OFFICE VISIT (OUTPATIENT)
Dept: PSYCHOLOGY | Facility: CLINIC | Age: 32
End: 2017-10-13

## 2017-10-13 DIAGNOSIS — F33.41 MAJOR DEPRESSIVE DISORDER, RECURRENT EPISODE, IN PARTIAL REMISSION WITH ANXIOUS DISTRESS (H): Primary | ICD-10-CM

## 2017-10-13 NOTE — MR AVS SNAPSHOT
After Visit Summary   10/13/2017    Elizabeth Miner    MRN: [id number]           Patient Information     Date Of Birth          1985        Visit Information        Provider Department      10/13/2017 2:00 PM Lenard Kim, PhD DOMINIC [facility]        Today's Diagnoses     Major depressive disorder, recurrent episode, in partial remission with anxious distress (H)    -  1       Follow-ups after your visit        Your next 10 appointments already scheduled     Oct 27, 2017  2:00 PM CDT   (Arrive by 1:45 PM)   Return Visit with Lenard Kim PhD DOMINIC   [facility] ([facility])    [address] [place] [address]   [phone]            Nov 08, 2017  3:00 PM CST   (Arrive by 2:45 PM)   Return Visit with Lenard Kim PhD LP   [facility] ([facility])    [address] [place] [id number] [phone]            Dec 01, 2017 11:00 AM CST   (Arrive by 10:45 AM)   Return Visit with Lenard Kim, PhD DOMINIC   [facility] ([facility])    [address] [place] [id number] [phone]              Who to contact     Please call your clinic at [phone] to:    Ask questions about your health    Make or cancel appointments    Discuss your medicines    Learn about your test results    Speak to your doctor   If you have compliments or concerns about an experience at your clinic, or if you wish to file a complaint, please contact [facility] Patient Relations at [phone] or email us at [email or web address]         Additional Information About Your Visit        MyChart Information     MyChart gives you secure access to your electronic health record. If you see a primary care provider, you can also send messages to your care team and make  appointments. If you have questions, please call your primary care clinic.  If you do not have a primary care provider, please call [phone] and they will assist you.      [employer] is an electronic gateway that provides easy, online access to your medical records. With [employer], you can request a clinic appointment, read your test results, renew a prescription or communicate with your care team.     To access your existing account, please contact your [facility] or call [phone] for assistance.        Care EveryWhere ID     This is your Care EveryWhere ID. This could be used by other organizations to access your [place] medical records  [id number]         Blood Pressure from Last 3 Encounters:   05/23/17 116/74   05/01/17 127/88   10/31/16 111/77    Weight from Last 3 Encounters:   05/23/17 78.2 kg (172 lb 8 oz)   05/01/17 78.5 kg (173 lb 1.6 oz)   10/31/16 79.4 kg (175 lb)              Today, you had the following     No orders found for display         Today's Medication Changes          These changes are accurate as of: 10/13/17 11:59 PM.  If you have any questions, ask your nurse or doctor.               These medicines have changed or have updated prescriptions.        Dose/Directions    * FLUoxetine 40 MG capsule   Commonly known as:  PROzac   This may have changed:  Another medication with the same name was changed. Make sure you understand how and when to take each.   Used for:  Adjustment disorder with depressed mood        Dose:  40 mg   Take 1 capsule (40 mg) by mouth daily   Quantity:  90 capsule   Refills:  0       * FLUoxetine 10 MG capsule   Commonly known as:  PROzac   This may have changed:    - how much to take  - additional instructions   Used for:  Adjustment disorder with depressed mood        Dose:  10 mg   Take 1 capsule (10 mg) by mouth daily   Quantity:  90 capsule   Refills:  0       * FLUoxetine HCl 60 MG Tabs   This may have changed:  Another  medication with the same name was changed. Make sure you understand how and when to take each.   Used for:  Adjustment disorder with depressed mood        Dose:  60 mg   Take 60 mg by mouth daily   Quantity:  90 tablet   Refills:  3       * Notice:  This list has 3 medication(s) that are the same as other medications prescribed for you. Read the directions carefully, and ask your doctor or other care provider to review them with you.             Primary Care Provider Office Phone # Fax #    Marisol Agnieszka Gray [phone] [address] [address]        Equal Access to Services     [facility]: Hadii aad ku hadasho Soomaali, waaxda luqadaha, qaybta kaalmada adeegyada, jennifer silva ademay gutierrez . So [facility] [phone].    ATENCIÓN: Si habla español, tiene a calles disposición servicios gratuitos de asistencia lingüística. [facility] [phone].    We comply with applicable federal civil rights laws and [place] laws. We do not discriminate on the basis of race, color, national origin, age, disability, sex, sexual orientation, or gender identity.            Thank you!     Thank you for choosing [facility]  for your care. Our goal is always to provide you with excellent care. Hearing back from our patients is one way we can continue to improve our services. Please take a few minutes to complete the written survey that you may receive in the mail after your visit with us. Thank you!             Your Updated Medication List - Protect others around you: Learn how to safely use, store and throw away your medicines at www.disposemymeds.org.          This list is accurate as of: 10/13/17 11:59 PM.  Always use your most recent med list.                   Brand Name Dispense Instructions for use Diagnosis    * FLUoxetine 40 MG capsule    PROzac    90 capsule    Take 1 capsule (40 mg) by mouth daily    Adjustment disorder with depressed mood        * FLUoxetine 10 MG capsule    PROzac    90 capsule    Take 1 capsule (10 mg) by mouth daily    Adjustment disorder with depressed mood       * FLUoxetine HCl 60 MG Tabs     90 tablet    Take 60 mg by mouth daily    Adjustment disorder with depressed mood       fluticasone 50 MCG/ACT spray    FLONASE     Spray 2 sprays into both nostrils as needed for rhinitis or allergies        norgestimate-ethinyl estradiol 0.25-35 MG-MCG per tablet    ORTHO-CYCLEN, SPRINTEC    84 tablet    Take 1 tablet by mouth daily    Encounter for initial prescription of contraceptive pills       sulfamethoxazole-trimethoprim 800-160 MG per tablet    BACTRIM DS/SEPTRA DS    14 tablet    Take 1 tablet by mouth 2 times daily    Cellulitis of other specified site       * Notice:  This list has 3 medication(s) that are the same as other medications prescribed for you. Read the directions carefully, and ask your doctor or other care provider to review them with you.

## 2017-10-16 NOTE — PROGRESS NOTES
Health Psychology                  [facility]    Department of Medicine  Pilar Moe, Ph.D., L.P. [phone]                          Clinic and Surgery Center  [facility] Lenard Kim, Ph.D.,  L.P. [phone]                 Health Psychology - 3rd floor  [place] Mail Code 74   Zhao Chowdhury, Ph.D., A.B.P.P., L.P. [phone]     [address] [place] [address] Carmela Taylor, Ph.D., L.P. [phone]  [place] [address]       Patient: Elizabeth Miner  Patient's YOB: 1985  MRN: [id number]  Psychologist: Lenard Kim, PhD,   Initial Evaluation Date: 8/5/2016  Treatment Plan Date: Aug 14, 2017        Health Psychology  Confidential Summary of Psychological Evaluation  Confidential Visit Summary    Service: Individual Psychotherapy     Start time:  10:04  Stop time:  11:02  Treatment modality:   Cognitive Behavioral and Supportive Psychotherapy   Insight Oriented Psychotherapy  Goals and Interventions:   Problem Goals   Mood Disorder  Depression  Anxiety   [x] Decrease symptoms  [x] Improve well being  [x] Improve coping  [] Change behavior/cognitions  [x] Improve psychosocial support  [] Improve decision making  [] Improve self-care with diet and exercise  [] Improve sleep habits/sleep quality  [] Monitor psychological status     Interventions Employed Today   [x] Fostered healthy therapeutic alliance  [] Addressed unhealthy cognitions  [x] Addressed unhealthy behaviors  [] Relaxation training  [] Psycho-education  [] Bibliotherapy  [x] Provided support  [] Explored/confronted resistance  [x] Developed insights into cognitions/behaviors  [x] Identified/Promoted adaptive coping strategies  [] Education/training in sleep   [] Education/training in mindfulness tools     Patient s progress on goals:   Discussed current issues at her workplace. Patient had a recent job interview. 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 Discussed her difficulty seeing assertive with supervisor and others. Explored the relationship between relationship dynamics at her current work and past experiences. Discussed differences and ways that men and women communicate their needs. Discussed expectation that others will have an intuitive awareness of what she needs.   discussed self-care.   Patient's response to treatment:  Engaged, reflective  Plan: Ongoing individual psychotherapy   Current mental health status:   General appearance:  Appropriate, well kept   Mood: anxiety present   Affect: mood congruent    Cognition: Appropriate for age   Orientation: Times three   Insight: fair to good   Memory: Appropriate long term / Short term   Psychosis: Denies   Suicidal / Homicidal Thoughts: Denies      Diagnosis:    Axis I: Major Depressive Disorder, recurrent, in partial remission   Axis II: deferred   Axis III: see EMR   Axis IV: parental divorce, seriously ill family member        Lenard Kim, PhD  Licensed Psychologist  Pager: [phone]

## 2017-10-27 ENCOUNTER — OFFICE VISIT (OUTPATIENT)
Dept: PSYCHOLOGY | Facility: CLINIC | Age: 32
End: 2017-10-27

## 2017-10-27 DIAGNOSIS — F33.41 MAJOR DEPRESSIVE DISORDER, RECURRENT EPISODE, IN PARTIAL REMISSION WITH ANXIOUS DISTRESS (H): Primary | ICD-10-CM

## 2017-10-27 NOTE — MR AVS SNAPSHOT
After Visit Summary   10/27/2017    Elizabeth Miner    MRN: [id number]           Patient Information     Date Of Birth          1985        Visit Information        Provider Department      10/27/2017 2:00 PM Lenard Kim, PhD DOMINIC [facility]        Today's Diagnoses     Major depressive disorder, recurrent episode, in partial remission with anxious distress (H)    -  1       Follow-ups after your visit        Your next 10 appointments already scheduled     Nov 08, 2017  3:00 PM CST   (Arrive by 2:45 PM)   Return Visit with Lenard Kim, PhD DOMINIC   [facility] ([facility])    [address] [place] [address] [phone]            Dec 01, 2017 11:00 AM CST   (Arrive by 10:45 AM)   Return Visit with Lenard Kim, PhD DOMINIC   [facility] ([facility])    [address] [place] [address] [phone]              Who to contact     Please call your clinic at [phone] to:    Ask questions about your health    Make or cancel appointments    Discuss your medicines    Learn about your test results    Speak to your doctor   If you have compliments or concerns about an experience at your clinic, or if you wish to file a complaint, please contact [facility] Patient Relations at [phone] or email us at [email or web address]         Additional Information About Your Visit        MyChart Information     [employer] gives you secure access to your electronic health record. If you see a primary care provider, you can also send messages to your care team and make appointments. If you have questions, please call your primary care clinic.  If you do not have a primary care provider, please call [phone] and they will assist you.      [employer] is an electronic gateway that provides easy, online access to your  medical records. With [employer], you can request a clinic appointment, read your test results, renew a prescription or communicate with your care team.     To access your existing account, please contact your [facility] or call [phone] for assistance.        Care EveryWhere ID     This is your Care EveryWhere ID. This could be used by other organizations to access your [place] medical records  [id number]         Blood Pressure from Last 3 Encounters:   05/23/17 116/74   05/01/17 127/88   10/31/16 111/77    Weight from Last 3 Encounters:   05/23/17 78.2 kg (172 lb 8 oz)   05/01/17 78.5 kg (173 lb 1.6 oz)   10/31/16 79.4 kg (175 lb)              Today, you had the following     No orders found for display         Today's Medication Changes          These changes are accurate as of: 10/27/17 11:59 PM.  If you have any questions, ask your nurse or doctor.               These medicines have changed or have updated prescriptions.        Dose/Directions    * FLUoxetine 40 MG capsule   Commonly known as:  PROzac   This may have changed:  Another medication with the same name was changed. Make sure you understand how and when to take each.   Used for:  Adjustment disorder with depressed mood        Dose:  40 mg   Take 1 capsule (40 mg) by mouth daily   Quantity:  90 capsule   Refills:  0       * FLUoxetine 10 MG capsule   Commonly known as:  PROzac   This may have changed:    - how much to take  - additional instructions   Used for:  Adjustment disorder with depressed mood        Dose:  10 mg   Take 1 capsule (10 mg) by mouth daily   Quantity:  90 capsule   Refills:  0       * FLUoxetine HCl 60 MG Tabs   This may have changed:  Another medication with the same name was changed. Make sure you understand how and when to take each.   Used for:  Adjustment disorder with depressed mood        Dose:  60 mg   Take 60 mg by mouth daily   Quantity:  90 tablet   Refills:  3       * Notice:  This  list has 3 medication(s) that are the same as other medications prescribed for you. Read the directions carefully, and ask your doctor or other care provider to review them with you.             Primary Care Provider Office Phone # Fax Elisa    Marisol Agnieszka Gray [phone] [address] [address]        Equal Access to Services     [facility]: Hadii aad ku hadasho Soomaali, waaxda luqadaha, qaybta kaalmada adeegyada, waxay idiin hayaan adeeg kharash la'aan ah. So [facility] [phone].    ATENCIÓN: Si habla español, tiene a calles disposición servicios gratuitos de asistencia lingüística. Rabia al [phone].    We comply with applicable federal civil rights laws and [place] laws. We do not discriminate on the basis of race, color, national origin, age, disability, sex, sexual orientation, or gender identity.            Thank you!     Thank you for choosing [facility]  for your care. Our goal is always to provide you with excellent care. Hearing back from our patients is one way we can continue to improve our services. Please take a few minutes to complete the written survey that you may receive in the mail after your visit with us. Thank you!             Your Updated Medication List - Protect others around you: Learn how to safely use, store and throw away your medicines at www.disposemymeds.org.          This list is accurate as of: 10/27/17 11:59 PM.  Always use your most recent med list.                   Brand Name Dispense Instructions for use Diagnosis    * FLUoxetine 40 MG capsule    PROzac    90 capsule    Take 1 capsule (40 mg) by mouth daily    Adjustment disorder with depressed mood       * FLUoxetine 10 MG capsule    PROzac    90 capsule    Take 1 capsule (10 mg) by mouth daily    Adjustment disorder with depressed mood       * FLUoxetine HCl 60 MG Tabs     90 tablet    Take 60 mg by mouth daily    Adjustment disorder with depressed mood        fluticasone 50 MCG/ACT spray    FLONASE     Spray 2 sprays into both nostrils as needed for rhinitis or allergies        norgestimate-ethinyl estradiol 0.25-35 MG-MCG per tablet    ORTHO-CYCLEN, SPRINTEC    84 tablet    Take 1 tablet by mouth daily    Encounter for initial prescription of contraceptive pills       sulfamethoxazole-trimethoprim 800-160 MG per tablet    BACTRIM DS/SEPTRA DS    14 tablet    Take 1 tablet by mouth 2 times daily    Cellulitis of other specified site       * Notice:  This list has 3 medication(s) that are the same as other medications prescribed for you. Read the directions carefully, and ask your doctor or other care provider to review them with you.

## 2017-10-30 NOTE — PROGRESS NOTES
Health Psychology                  [facility]    Department of Medicine  Pilar Moe, Ph.D., L.P. [phone]                          Clinic and Surgery Center  [facility] Lenard Kim, Ph.D.,  L.P. [phone]                 Health Psychology - 3rd floor  [place] [address]   Zhao Chowdhury, Ph.D., A.B.P.P., L.P. [phone]     [address] [place] [address] Carmela Taylor, Ph.D., L.P. [phone]  [place] [address]       Patient: Elizabeth Miner  Patient's YOB: 1985  MRN: [id number]  Psychologist: Lenard Kim, PhD,   Initial Evaluation Date: 8/5/2016  Treatment Plan Date: Aug 14, 2017        Health Psychology  Confidential Summary of Psychological Evaluation  Confidential Visit Summary    Service: Individual Psychotherapy     Start time:  2:04  Stop time:  3:00  Treatment modality:   Cognitive Behavioral and Supportive Psychotherapy   Insight Oriented Psychotherapy  Goals and Interventions:   Problem Goals   Mood Disorder  Depression  Anxiety   [x] Decrease symptoms  [x] Improve well being  [x] Improve coping  [] Change behavior/cognitions  [x] Improve psychosocial support  [] Improve decision making  [] Improve self-care with diet and exercise  [] Improve sleep habits/sleep quality  [] Monitor psychological status     Interventions Employed Today   [x] Fostered healthy therapeutic alliance  [] Addressed unhealthy cognitions  [x] Addressed unhealthy behaviors  [] Relaxation training  [] Psycho-education  [] Bibliotherapy  [x] Provided support  [] Explored/confronted resistance  [x] Developed insights into cognitions/behaviors  [x] Identified/Promoted adaptive coping strategies  [] Education/training in sleep   [] Education/training in mindfulness tools     Patient s progress on goals:   Discussed disappointing news that she didn't get the job she had interviewed for.  Discussed other challenges today and the difficulty she  has with doing things that are taking care of herself.  She does report starting to exercise.  Discussed activities like exercise that promote greater well being and help with depressed mood and anxiety.  Discussed self-care today and moving forward.   Patient's response to treatment:  Engaged, reflective  Plan: Ongoing individual psychotherapy   Current mental health status:   General appearance:  Appropriate, well kept   Mood: disappointed   Affect: mood congruent    Cognition: Appropriate for age   Orientation: Times three   Insight: fair to good   Memory: Appropriate long term / Short term   Psychosis: Denies   Suicidal / Homicidal Thoughts: Denies      Diagnosis:    Axis I: Major Depressive Disorder, recurrent, in partial remission   Axis II: deferred   Axis III: see EMR   Axis IV: parental divorce, seriously ill family member        Lenard Kim, PhD  Licensed Psychologist  Pager: [phone]

## 2017-11-08 ENCOUNTER — OFFICE VISIT (OUTPATIENT)
Dept: PSYCHOLOGY | Facility: CLINIC | Age: 32
End: 2017-11-08

## 2017-11-08 DIAGNOSIS — F33.41 MAJOR DEPRESSIVE DISORDER, RECURRENT EPISODE, IN PARTIAL REMISSION WITH ANXIOUS DISTRESS (H): Primary | ICD-10-CM

## 2017-11-08 NOTE — MR AVS SNAPSHOT
After Visit Summary   11/8/2017    Elizabeth Miner    MRN: [id number]           Patient Information     Date Of Birth          1985        Visit Information        Provider Department      11/8/2017 3:00 PM Lenard Kim, PhD [facility]        Today's Diagnoses     Major depressive disorder, recurrent episode, in partial remission with anxious distress (H)    -  1       Follow-ups after your visit        Who to contact     Please call your clinic at [phone] to:    Ask questions about your health    Make or cancel appointments    Discuss your medicines    Learn about your test results    Speak to your doctor   If you have compliments or concerns about an experience at your clinic, or if you wish to file a complaint, please contact [facility] Patient Relations at [phone] or email us at [email or web address]         Additional Information About Your Visit        MyChart Information     [employer] gives you secure access to your electronic health record. If you see a primary care provider, you can also send messages to your care team and make appointments. If you have questions, please call your primary care clinic.  If you do not have a primary care provider, please call [phone] and they will assist you.      [employer] is an electronic gateway that provides easy, online access to your medical records. With [employer], you can request a clinic appointment, read your test results, renew a prescription or communicate with your care team.     To access your existing account, please contact your [facility] or call [phone] for assistance.        Care EveryWhere ID     This is your Care EveryWhere ID. This could be used by other organizations to access your [place] medical records  [id number]         Blood Pressure from Last 3 Encounters:   05/23/17 116/74   05/01/17 127/88   10/31/16 111/77    Weight  from Last 3 Encounters:   05/23/17 78.2 kg (172 lb 8 oz)   05/01/17 78.5 kg (173 lb 1.6 oz)   10/31/16 79.4 kg (175 lb)              Today, you had the following     No orders found for display         Today's Medication Changes          These changes are accurate as of: 11/8/17 11:59 PM.  If you have any questions, ask your nurse or doctor.               These medicines have changed or have updated prescriptions.        Dose/Directions    * FLUoxetine 40 MG capsule   Commonly known as:  PROzac   This may have changed:  Another medication with the same name was changed. Make sure you understand how and when to take each.   Used for:  Adjustment disorder with depressed mood        Dose:  40 mg   Take 1 capsule (40 mg) by mouth daily   Quantity:  90 capsule   Refills:  0       * FLUoxetine 10 MG capsule   Commonly known as:  PROzac   This may have changed:    - how much to take  - additional instructions   Used for:  Adjustment disorder with depressed mood        Dose:  10 mg   Take 1 capsule (10 mg) by mouth daily   Quantity:  90 capsule   Refills:  0       * FLUoxetine HCl 60 MG Tabs   This may have changed:  Another medication with the same name was changed. Make sure you understand how and when to take each.   Used for:  Adjustment disorder with depressed mood        Dose:  60 mg   Take 60 mg by mouth daily   Quantity:  90 tablet   Refills:  3       * Notice:  This list has 3 medication(s) that are the same as other medications prescribed for you. Read the directions carefully, and ask your doctor or other care provider to review them with you.             Primary Care Provider Office Phone # Fax #    Marisol Gray [phone] [address] [address]        Equal Access to Services     [facility]: Nidia Yan, luis eduardo casarez, qajennifer maddox. So [facility] [phone].    ATENCIÓN: Si  eduard allred, tiene a calles disposición servicios gratuitos de asistencia lingüística. Rabia sanchez [phone].    We comply with applicable federal civil rights laws and [place] laws. We do not discriminate on the basis of race, color, national origin, age, disability, sex, sexual orientation, or gender identity.            Thank you!     Thank you for choosing [facility]  for your care. Our goal is always to provide you with excellent care. Hearing back from our patients is one way we can continue to improve our services. Please take a few minutes to complete the written survey that you may receive in the mail after your visit with us. Thank you!             Your Updated Medication List - Protect others around you: Learn how to safely use, store and throw away your medicines at www.disposemymeds.org.          This list is accurate as of: 11/8/17 11:59 PM.  Always use your most recent med list.                   Brand Name Dispense Instructions for use Diagnosis    * FLUoxetine 40 MG capsule    PROzac    90 capsule    Take 1 capsule (40 mg) by mouth daily    Adjustment disorder with depressed mood       * FLUoxetine 10 MG capsule    PROzac    90 capsule    Take 1 capsule (10 mg) by mouth daily    Adjustment disorder with depressed mood       * FLUoxetine HCl 60 MG Tabs     90 tablet    Take 60 mg by mouth daily    Adjustment disorder with depressed mood       fluticasone 50 MCG/ACT spray    FLONASE     Spray 2 sprays into both nostrils as needed for rhinitis or allergies        norgestimate-ethinyl estradiol 0.25-35 MG-MCG per tablet    ORTHO-CYCLEN, SPRINTEC    84 tablet    Take 1 tablet by mouth daily    Encounter for initial prescription of contraceptive pills       sulfamethoxazole-trimethoprim 800-160 MG per tablet    BACTRIM DS/SEPTRA DS    14 tablet    Take 1 tablet by mouth 2 times daily    Cellulitis of other specified site       * Notice:  This list has 3 medication(s) that are the same as  other medications prescribed for you. Read the directions carefully, and ask your doctor or other care provider to review them with you.

## 2017-12-16 ENCOUNTER — OFFICE VISIT (OUTPATIENT)
Dept: OPHTHALMOLOGY | Facility: CLINIC | Age: 32
End: 2017-12-16
Payer: COMMERCIAL

## 2017-12-16 DIAGNOSIS — H52.13 MYOPIA OF BOTH EYES: Primary | ICD-10-CM

## 2017-12-16 ASSESSMENT — REFRACTION_WEARINGRX
OS_CYLINDER: +1.00
OD_SPHERE: -2.00
OD_CYLINDER: +0.50
OS_SPHERE: -3.00
OS_AXIS: 135
OD_AXIS: 005

## 2017-12-16 ASSESSMENT — VISUAL ACUITY
OD_CC+: -
CORRECTION_TYPE: GLASSES
METHOD: SNELLEN - LINEAR
OD_CC: 20/20
OS_CC: 20/20

## 2017-12-16 ASSESSMENT — REFRACTION_MANIFEST
OD_SPHERE: -2.00
OD_CYLINDER: +0.75
OD_AXIS: 005
OS_CYLINDER: +1.00
OS_AXIS: 143
OS_SPHERE: -3.00

## 2017-12-16 ASSESSMENT — TONOMETRY
IOP_METHOD: ICARE
OS_IOP_MMHG: 20
OD_IOP_MMHG: 23

## 2017-12-16 ASSESSMENT — CONF VISUAL FIELD
OD_NORMAL: 1
METHOD: COUNTING FINGERS
OS_NORMAL: 1

## 2017-12-16 ASSESSMENT — CUP TO DISC RATIO
OS_RATIO: 0.20
OD_RATIO: 0.20

## 2017-12-16 ASSESSMENT — EXTERNAL EXAM - RIGHT EYE: OD_EXAM: NORMAL

## 2017-12-16 ASSESSMENT — SLIT LAMP EXAM - LIDS
COMMENTS: NORMAL
COMMENTS: NORMAL

## 2017-12-16 ASSESSMENT — EXTERNAL EXAM - LEFT EYE: OS_EXAM: NORMAL

## 2017-12-16 NOTE — PROGRESS NOTES
A/P  1.) Myopia/Astigmatism OU  -Stable spec Rx, updated today  -Reviewed flashes/floaters and need for stat eye eval should they occur    Monitor 1-2 years routine, sooner prn    I have confirmed the patient's CC, HPI and reviewed Past Medical History, Past Surgical History, Social History, Family History, Problem List, Medication List and agree with Tech note.     Ngoc Wallace, OD FAAO

## 2017-12-16 NOTE — NURSING NOTE
Chief Complaints and History of Present Illnesses   Patient presents with     Annual Eye Exam     HPI    Affected eye(s):  Both   Symptoms:     No blurred vision      Frequency:  Constant       Do you have eye pain now?:  No      Comments:  Patient states vision has been stable since last visit both eyes. Denies eye pain or irritation. Does not take eye drops.  Needs new glasses, they are scratched but vision is still good in them.    Gunjan UNDERWOOD 10:09 AM December 16, 2017

## 2017-12-16 NOTE — MR AVS SNAPSHOT
After Visit Summary   12/16/2017    Elizabeth Miner    MRN: [id number]           Patient Information     Date Of Birth          1985        Visit Information        Provider Department      12/16/2017 10:00 AM Ngoc Wallace OD [facility]hthal[facility]        Today's Diagnoses     Myopia of both eyes    -  1       Follow-ups after your visit        Who to contact     Please call your clinic at [phone] to:    Ask questions about your health    Make or cancel appointments    Discuss your medicines    Learn about your test results    Speak to your doctor   If you have compliments or concerns about an experience at your clinic, or if you wish to file a complaint, please contact [facility] Patient Relations at [phone] or email us at [email or web address]         Additional Information About Your Visit        MyChart Information     [employer] gives you secure access to your electronic health record. If you see a primary care provider, you can also send messages to your care team and make appointments. If you have questions, please call your primary care clinic.  If you do not have a primary care provider, please call [phone] and they will assist you.      [employer] is an electronic gateway that provides easy, online access to your medical records. With [employer], you can request a clinic appointment, read your test results, renew a prescription or communicate with your care team.     To access your existing account, please contact your [facility] or call [phone] for assistance.        Care EveryWhere ID     This is your Care EveryWhere ID. This could be used by other organizations to access your [place] medical records  [id number]         Blood Pressure from Last 3 Encounters:   05/23/17 116/74   05/01/17 127/88   10/31/16 111/77    Weight from Last 3 Encounters:   05/23/17 78.2 kg (172 lb 8 oz)   05/01/17 78.5 kg  (173 lb 1.6 oz)   10/31/16 79.4 kg (175 lb)              We Performed the Following     REFRACTION [24530]          Today's Medication Changes          These changes are accurate as of: 12/16/17 11:09 AM.  If you have any questions, ask your nurse or doctor.               These medicines have changed or have updated prescriptions.        Dose/Directions    * FLUoxetine 40 MG capsule   Commonly known as:  PROzac   This may have changed:  Another medication with the same name was changed. Make sure you understand how and when to take each.   Used for:  Adjustment disorder with depressed mood   Changed by:  Marisol Serrato MD        Dose:  40 mg   Take 1 capsule (40 mg) by mouth daily   Quantity:  90 capsule   Refills:  0       * FLUoxetine 10 MG capsule   Commonly known as:  PROzac   This may have changed:    - how much to take  - additional instructions   Used for:  Adjustment disorder with depressed mood   Changed by:  Marisol Serrato MD        Dose:  10 mg   Take 1 capsule (10 mg) by mouth daily   Quantity:  90 capsule   Refills:  0       * FLUoxetine HCl 60 MG Tabs   This may have changed:  Another medication with the same name was changed. Make sure you understand how and when to take each.   Used for:  Adjustment disorder with depressed mood   Changed by:  Marisol Serrato MD        Dose:  60 mg   Take 60 mg by mouth daily   Quantity:  90 tablet   Refills:  3       * Notice:  This list has 3 medication(s) that are the same as other medications prescribed for you. Read the directions carefully, and ask your doctor or other care provider to review them with you.             Primary Care Provider Office Phone # Fax #    Marisol Gray [phone] [address] [address]        Equal Access to Services     [facility]: Hadii mariela Yan, luis eduardo casarez, jennifer bonilla  salimaedwardgabriela driverLittleaaerin ah. So [facility] [phone].    ATENCIÓN: Si eduard allred, tiene a calles disposición servicios gratuitos de asistencia lingüística. Rabia sanchez [phone].    We comply with applicable federal civil rights laws and [place] laws. We do not discriminate on the basis of race, color, national origin, age, disability, sex, sexual orientation, or gender identity.            Thank you!     Thank you for choosing [facility] OPHTHALMOLOG[facility]  for your care. Our goal is always to provide you with excellent care. Hearing back from our patients is one way we can continue to improve our services. Please take a few minutes to complete the written survey that you may receive in the mail after your visit with us. Thank you!             Your Updated Medication List - Protect others around you: Learn how to safely use, store and throw away your medicines at www.disposemymeds.org.          This list is accurate as of: 12/16/17 11:09 AM.  Always use your most recent med list.                   Brand Name Dispense Instructions for use Diagnosis    * FLUoxetine 40 MG capsule    PROzac    90 capsule    Take 1 capsule (40 mg) by mouth daily    Adjustment disorder with depressed mood       * FLUoxetine 10 MG capsule    PROzac    90 capsule    Take 1 capsule (10 mg) by mouth daily    Adjustment disorder with depressed mood       * FLUoxetine HCl 60 MG Tabs     90 tablet    Take 60 mg by mouth daily    Adjustment disorder with depressed mood       fluticasone 50 MCG/ACT spray    FLONASE     Spray 2 sprays into both nostrils as needed for rhinitis or allergies        norgestimate-ethinyl estradiol 0.25-35 MG-MCG per tablet    ORTHO-CYCLEN, SPRINTEC    84 tablet    Take 1 tablet by mouth daily    Encounter for initial prescription of contraceptive pills       sulfamethoxazole-trimethoprim 800-160 MG per tablet    BACTRIM DS/SEPTRA DS    14 tablet    Take 1 tablet by mouth 2 times daily    Cellulitis of other specified site       * Notice:   This list has 3 medication(s) that are the same as other medications prescribed for you. Read the directions carefully, and ask your doctor or other care provider to review them with you.

## 2018-01-03 NOTE — PROGRESS NOTES
Health Psychology                  [facility]    Department of Medicine  Pilar Moe, Ph.D., L.P. [phone]                          Clinic and [facility] Center  [facility] Lenard Kim, Ph.D.,  L.P. [phone]                 Health Psychology  [address] [facility] Mail Code 749   Zhao Chowdhury, Ph.D., A.B.P.P., L.P. [phone]     [address] [place] [address] Carmela Taylor, Ph.D., L.P. [phone]  [place] [address]       Patient: Elizabeth Miner  Patient's YOB: 1985  MRN: [id number]  Psychologist: Lenard Kim, PhD,   Initial Evaluation Date: 8/5/2016  Treatment Plan Date: Aug 14, 2017        Health Psychology  Confidential Summary of Psychological Evaluation  Confidential Visit Summary    Service: Individual Psychotherapy     Start time:  3:05  Stop time:  4:00  Treatment modality:   Cognitive Behavioral and Supportive Psychotherapy   Insight Oriented Psychotherapy  Goals and Interventions:   Problem Goals   Mood Disorder  Depression  Anxiety   [x] Decrease symptoms  [x] Improve well being  [x] Improve coping  [] Change behavior/cognitions  [x] Improve psychosocial support  [] Improve decision making  [] Improve self-care with diet and exercise  [] Improve sleep habits/sleep quality  [] Monitor psychological status     Interventions Employed Today   [x] Fostered healthy therapeutic alliance  [] Addressed unhealthy cognitions  [x] Addressed unhealthy behaviors  [] Relaxation training  [] Psycho-education  [] Bibliotherapy  [x] Provided support  [] Explored/confronted resistance  [x] Developed insights into cognitions/behaviors  [x] Identified/Promoted adaptive coping strategies  [] Education/training in sleep   [] Education/training in mindfulness tools     Patient s progress on goals:   Elizabeth discussed holiday plans and self-care strategies.  Discussed the holidays without her brother this year.  Discussed issues of grief  and relief that he is not suffering and his mother is not overwhelmed by the care he required.  Discussed professional goals and self-advocacy.    Patient's response to treatment:  Engaged, reflective  Plan: Ongoing individual psychotherapy   Current mental health status:   General appearance:  Appropriate, well kept   Mood: worried   Affect: mood congruent    Cognition: Appropriate for age   Orientation: Times three   Insight: fair to good   Memory: Appropriate long term / Short term   Psychosis: Denies   Suicidal / Homicidal Thoughts: Denies      Diagnosis:    Axis I: Major Depressive Disorder, recurrent, in partial remission; anxiety disorder, nos   Axis II: deferred   Axis III: see EMR   Axis IV: parental divorce, seriously ill family member        Lenard Kim, PhD  Licensed Psychologist  Pager: [phone]

## 2018-02-15 ENCOUNTER — OFFICE VISIT (OUTPATIENT)
Dept: FAMILY MEDICINE | Facility: CLINIC | Age: 33
End: 2018-02-15
Payer: COMMERCIAL

## 2018-02-15 VITALS
TEMPERATURE: 98.2 F | RESPIRATION RATE: 16 BRPM | OXYGEN SATURATION: 97 % | WEIGHT: 168.8 LBS | DIASTOLIC BLOOD PRESSURE: 80 MMHG | SYSTOLIC BLOOD PRESSURE: 123 MMHG | BODY MASS INDEX: 28.82 KG/M2 | HEIGHT: 64 IN | HEART RATE: 95 BPM

## 2018-02-15 DIAGNOSIS — J01.00 ACUTE MAXILLARY SINUSITIS, RECURRENCE NOT SPECIFIED: ICD-10-CM

## 2018-02-15 DIAGNOSIS — R07.0 THROAT PAIN: Primary | ICD-10-CM

## 2018-02-15 LAB — S PYO AG THROAT QL IA.RAPID: NORMAL

## 2018-02-15 RX ORDER — SULFAMETHOXAZOLE/TRIMETHOPRIM 800-160 MG
1 TABLET ORAL 2 TIMES DAILY
Qty: 14 TABLET | Refills: 0 | Status: SHIPPED | OUTPATIENT
Start: 2018-02-15 | End: 2018-03-07

## 2018-02-15 ASSESSMENT — PAIN SCALES - GENERAL: PAINLEVEL: NO PAIN (0)

## 2018-02-15 NOTE — NURSING NOTE
"32 year old  Chief Complaint   Patient presents with     URI     x week. Got a sore throat on Friday. Leaking mucus x 2 days. Woke up this morning and symptoms is worse. Eyes are swollen. When blowing nose, pt can feel it in her jaws.        Blood pressure 123/80, pulse 95, temperature 98.2  F (36.8  C), temperature source Oral, resp. rate 16, height 5' 4.3\" (163.3 cm), weight 168 lb 12.8 oz (76.6 kg), last menstrual period 02/06/2018, SpO2 97 %, not currently breastfeeding. Body mass index is 28.7 kg/(m^2).  BP completed using cuff size: regular    Patient Active Problem List   Diagnosis     Major depressive disorder, recurrent episode, in partial remission with anxious distress (H)     Cellulitis of other specified site     Asplenia       Wt Readings from Last 2 Encounters:   02/15/18 168 lb 12.8 oz (76.6 kg)   05/23/17 172 lb 8 oz (78.2 kg)     BP Readings from Last 3 Encounters:   02/15/18 123/80   05/23/17 116/74   05/01/17 127/88       Current Outpatient Prescriptions   Medication     FLUoxetine HCl 60 MG TABS     FLUoxetine (PROZAC) 40 MG capsule     FLUoxetine (PROZAC) 10 MG capsule     norgestimate-ethinyl estradiol (ORTHO-CYCLEN, SPRINTEC) 0.25-35 MG-MCG per tablet     fluticasone (FLONASE) 50 MCG/ACT nasal spray     sulfamethoxazole-trimethoprim (BACTRIM DS/SEPTRA DS) 800-160 MG per tablet     No current facility-administered medications for this visit.        Social History   Substance Use Topics     Smoking status: Never Smoker     Smokeless tobacco: Never Used     Alcohol use 0.0 oz/week     0 Standard drinks or equivalent per week      Comment: occasional social drinking       Health Maintenance Due   Topic Date Due     INFLUENZA VACCINE (SYSTEM ASSIGNED)  09/01/2017       Lab Results   Component Value Date    PAP NIL 05/20/2016       PHQ-2 ( 1999 [employer]) 5/1/2017 9/19/2016   Q1: Little interest or pleasure in doing things 1 -   Q2: Feeling down, depressed or hopeless 1 -   PHQ-2 Score 2 -   Q1: " Little interest or pleasure in doing things - More than half the days   Q2: Feeling down, depressed or hopeless - More than half the days   PHQ-2 Score - 4       PHQ-9 SCORE 8/27/2016 9/19/2016 10/31/2016 5/1/2017   Total Score MyChart 11 (Moderate depression) 9 (Mild depression) - -   Total Score - - 10 4   Some encounter information is confidential and restricted. Go to Review Flowsheets activity to see all data.       ELY-7 SCORE 8/27/2016 9/19/2016 10/31/2016   Total Score 3 (minimal anxiety) 3 (minimal anxiety) -   Total Score - - 3   Some encounter information is confidential and restricted. Go to Review Flowsheets activity to see all data.       No flowsheet data found.    rEica Sweeney MA  February 15, 2018 9:04 AM

## 2018-02-15 NOTE — MR AVS SNAPSHOT
"              After Visit Summary   2/15/2018    Elizabeth Miner    MRN: [id number]           Patient Information     Date Of Birth          1985        Visit Information        Provider Department      2/15/2018 9:00 AM Mavis Mendes NP [facility]        Today's Diagnoses     Throat pain    -  1    Acute maxillary sinusitis, recurrence not specified           Follow-ups after your visit        Who to contact     Please call your clinic at [phone] to:    Ask questions about your health    Make or cancel appointments    Discuss your medicines    Learn about your test results    Speak to your doctor            Additional Information About Your Visit        MyChart Information     The [facility] gives you secure access to your electronic health record. If you see a primary care provider, you can also send messages to your care team and make appointments. If you have questions, please call your primary care clinic.  If you do not have a primary care provider, please call [phone] and they will assist you.      The [facility] is an electronic gateway that provides easy, online access to your medical records. With [facility], you can request a clinic appointment, read your test results, renew a prescription or communicate with your care team.     To access your existing account, please contact your [facility] or call [phone] for assistance.        Care EveryWhere ID     This is your Care EveryWhere ID. This could be used by other organizations to access your [facility] medical records  [id number]        Your Vitals Were     Pulse Temperature Respirations Height Last Period Pulse Oximetry    95 98.2  F (36.8  C) (Oral) 16 5' 4.3\" (163.3 cm) 02/06/2018 (Exact Date) 97%    Breastfeeding? BMI (Body Mass Index)                No 28.7 kg/m2           Blood Pressure from Last 3 Encounters:   02/15/18 123/80   05/23/17 116/74   05/01/17 127/88    Weight from Last 3 Encounters: "   02/15/18 168 lb 12.8 oz (76.6 kg)   05/23/17 172 lb 8 oz (78.2 kg)   05/01/17 173 lb 1.6 oz (78.5 kg)              We Performed the Following     Strep Culture (GABS)     Strep Screen Rapid (Group) (AP P NP CLINIC)          Today's Medication Changes          These changes are accurate as of 2/15/18  9:28 AM.  If you have any questions, ask your nurse or doctor.               These medicines have changed or have updated prescriptions.        Dose/Directions    * FLUoxetine 40 MG capsule   Commonly known as:  PROzac   This may have changed:  Another medication with the same name was changed. Make sure you understand how and when to take each.   Used for:  Adjustment disorder with depressed mood        Dose:  40 mg   Take 1 capsule (40 mg) by mouth daily   Quantity:  90 capsule   Refills:  0       * FLUoxetine 10 MG capsule   Commonly known as:  PROzac   This may have changed:    - how much to take  - additional instructions   Used for:  Adjustment disorder with depressed mood        Dose:  10 mg   Take 1 capsule (10 mg) by mouth daily   Quantity:  90 capsule   Refills:  0       * FLUoxetine HCl 60 MG Tabs   This may have changed:  Another medication with the same name was changed. Make sure you understand how and when to take each.   Used for:  Adjustment disorder with depressed mood        Dose:  60 mg   Take 60 mg by mouth daily   Quantity:  90 tablet   Refills:  3       * sulfamethoxazole-trimethoprim 800-160 MG per tablet   Commonly known as:  BACTRIM DS/SEPTRA DS   This may have changed:  Another medication with the same name was added. Make sure you understand how and when to take each.   Used for:  Cellulitis of other specified site   Changed by:  Mavis Mendes NP        Dose:  1 tablet   Take 1 tablet by mouth 2 times daily   Quantity:  14 tablet   Refills:  0       * sulfamethoxazole-trimethoprim 800-160 MG per tablet   Commonly known as:  BACTRIM DS/SEPTRA DS   This may have changed:  You were  already taking a medication with the same name, and this prescription was added. Make sure you understand how and when to take each.   Used for:  Acute maxillary sinusitis, recurrence not specified   Changed by:  Mavis Mendes NP        Dose:  1 tablet   Take 1 tablet by mouth 2 times daily   Quantity:  14 tablet   Refills:  0       * Notice:  This list has 5 medication(s) that are the same as other medications prescribed for you. Read the directions carefully, and ask your doctor or other care provider to review them with you.         Where to get your medicines      These medications were sent to [id number] - [place], [place] - [address] [facility]  [address] [facility] [address]     Phone:  [phone]     sulfamethoxazole-trimethoprim 800-160 MG per tablet                Primary Care Provider Office Phone # Fax #    Marisol Gray [phone] [address] [facility] [address] [place] [address]        Equal Access to Services     GUNJAN VANEGAS AH: Hadii mariela ku hadasho Soomaali, waaxda luqadaha, qaybta kaalmada adeegyada, waxay katiein hayiglesia gutierrez . So [facility] [phone].    ATENCIÓN: Si habla español, tiene a calles disposición servicios gratuitos de asistencia lingüística. Llame al [phone].    We comply with applicable federal civil rights laws and [place] laws. We do not discriminate on the basis of race, color, national origin, age, disability, sex, sexual orientation, or gender identity.            Thank you!     Thank you for choosing [facility]  for your care. Our goal is always to provide you with excellent care. Hearing back from our patients is one way we can continue to improve our services. Please take a few minutes to complete the written survey that you may receive in the mail after your visit with us. Thank you!             Your Updated Medication List - Protect others around you: Learn how to safely use, store and  throw away your medicines at www.disposemymeds.org.          This list is accurate as of 2/15/18  9:28 AM.  Always use your most recent med list.                   Brand Name Dispense Instructions for use Diagnosis    * FLUoxetine 40 MG capsule    PROzac    90 capsule    Take 1 capsule (40 mg) by mouth daily    Adjustment disorder with depressed mood       * FLUoxetine 10 MG capsule    PROzac    90 capsule    Take 1 capsule (10 mg) by mouth daily    Adjustment disorder with depressed mood       * FLUoxetine HCl 60 MG Tabs     90 tablet    Take 60 mg by mouth daily    Adjustment disorder with depressed mood       fluticasone 50 MCG/ACT spray    FLONASE     Spray 2 sprays into both nostrils as needed for rhinitis or allergies        norgestimate-ethinyl estradiol 0.25-35 MG-MCG per tablet    ORTHO-CYCLEN, SPRINTEC    84 tablet    Take 1 tablet by mouth daily    Encounter for initial prescription of contraceptive pills       * sulfamethoxazole-trimethoprim 800-160 MG per tablet    BACTRIM DS/SEPTRA DS    14 tablet    Take 1 tablet by mouth 2 times daily    Cellulitis of other specified site       * sulfamethoxazole-trimethoprim 800-160 MG per tablet    BACTRIM DS/SEPTRA DS    14 tablet    Take 1 tablet by mouth 2 times daily    Acute maxillary sinusitis, recurrence not specified       * Notice:  This list has 5 medication(s) that are the same as other medications prescribed for you. Read the directions carefully, and ask your doctor or other care provider to review them with you.

## 2018-02-17 LAB
BACTERIA SPEC CULT: NORMAL
SPECIMEN SOURCE: NORMAL

## 2018-03-07 ENCOUNTER — OFFICE VISIT (OUTPATIENT)
Dept: FAMILY MEDICINE | Facility: CLINIC | Age: 33
End: 2018-03-07
Payer: COMMERCIAL

## 2018-03-07 VITALS
HEART RATE: 86 BPM | DIASTOLIC BLOOD PRESSURE: 78 MMHG | RESPIRATION RATE: 14 BRPM | BODY MASS INDEX: 28.82 KG/M2 | SYSTOLIC BLOOD PRESSURE: 123 MMHG | OXYGEN SATURATION: 98 % | TEMPERATURE: 97.3 F | WEIGHT: 173 LBS | HEIGHT: 65 IN

## 2018-03-07 DIAGNOSIS — J01.90 ACUTE SINUSITIS TREATED WITH ANTIBIOTICS IN THE PAST 60 DAYS: Primary | ICD-10-CM

## 2018-03-07 RX ORDER — DOXYCYCLINE 100 MG/1
100 CAPSULE ORAL 2 TIMES DAILY
Qty: 28 CAPSULE | Refills: 0 | Status: SHIPPED | OUTPATIENT
Start: 2018-03-07 | End: 2018-07-13

## 2018-03-07 NOTE — PROGRESS NOTES
"SUBJECTIVE:  Elizabeth Miner is a 32 year old female presents for   Chief Complaint   Patient presents with     RECHECK     Sinusitis - Not sure if shes still sick or has something new. Took abx.         HPI:    HPI:  Had sinus injection a few weeks ago, was treated with bactrim and symptoms mostly resolved. After taking runny nose, cough, headaches returned. Uncertain if infection entirely resolved or if has a new infection. With previous antibiotic treatment symptoms had not resolved entirely. Headaches now occur daily, rhinorrhea with clear drainage, has cough with whitish sputum. Denies any known fevers. Has been fatigued, chest tightness with deep breathing. Denies any wheezing. Uncertain if she has been around any one who is sick. Has history of sinus infections once a year several years ago. Has been to ENT previously for recurrent tonsillitis and had tonsillectomy in 2009.    No past medical history on file.    Review Of Systems    Constitutional: no fevers, chills, night sweats or unintentional weight change   Eyes: no vision change, diplopia or red eyes   Ears: No tinnitus, hearing change. Has history of cerumen build up and \"dull\" hearing.   Nose: No epistaxis. Having clear nasal drainage.   Mouth: No oral lesions.   Throat: throat clear   Cardiovascular: no chest pain, palpitations, or pain with walking, no orthopnea or PND   Respiratory: no dyspnea, (+) cough with white sputum, shortness of breath or wheezing   GI: no nausea, vomiting, diarrhea or constipation, no abdominal pain   Integumentary: no concerning lesions or moles   Heme/Lymph: no concerning bumps, no bleeding problems   Allergy: no environmental allergies. Seasonal allergies to pollen.   Psych: no depression or anxiety, no sleep problems        OBJECTIVE:      Physical Exam  /78 (BP Location: Left arm, Patient Position: Chair, Cuff Size: Adult Regular)  Pulse 86  Temp 97.3  F (36.3  C) (Oral)  Resp 14  Ht 5' 4.9\" (164.8 cm)  Wt 173 " lb (78.5 kg)  LMP 02/06/2018 (Exact Date)  SpO2 98%  Breastfeeding? No  BMI 28.88 kg/m2      Constitutional: no distress, comfortable, pleasant   Eyes: clear without conjunctival injection, anicteric, normal extra-ocular movements, FERNANDEZ   Ears: unable to visualize TMs bilaterally d/t cerumen build up. Encouraged patient to use her ear drops to clear canals.   Neck supple. No thyromegaly  Cardiovascular: regular rate and rhythm, normal S1 and S2, no murmurs, rubs or gallops, peripheral pulses full and symmetric   Respiratory: clear to auscultation, no wheezes or crackles, normal breath sounds   Skin: no concerning lesions, no jaundice   Psychological: appropriate mood   Lymphatic: no cervical, tonsillar, sub-mandibular lymphadenopathy    Current Medications  Current Outpatient Prescriptions   Medication Sig Dispense Refill     doxycycline (VIBRAMYCIN) 100 MG capsule Take 1 capsule (100 mg) by mouth 2 times daily 28 capsule 0     FLUoxetine HCl 60 MG TABS Take 60 mg by mouth daily 90 tablet 3     norgestimate-ethinyl estradiol (ORTHO-CYCLEN, SPRINTEC) 0.25-35 MG-MCG per tablet Take 1 tablet by mouth daily 84 tablet 3     [DISCONTINUED] FLUoxetine (PROZAC) 40 MG capsule Take 1 capsule (40 mg) by mouth daily (Patient not taking: Reported on 3/7/2018) 90 capsule 0     [DISCONTINUED] FLUoxetine (PROZAC) 10 MG capsule Take 1 capsule (10 mg) by mouth daily (Patient not taking: Reported on 3/7/2018) 90 capsule 0     fluticasone (FLONASE) 50 MCG/ACT nasal spray Spray 2 sprays into both nostrils as needed for rhinitis or allergies         Allergies     Allergies   Allergen Reactions     Azithromycin      Lips swell     Cephalexin      hives     Penicillins      hives     Seasonal Allergies        ASSESSMENT/PLAN:  Elizabeth was seen today for recheck.    Diagnoses and all orders for this visit:    Acute sinusitis treated with antibiotics in the past 60 days  -     doxycycline (VIBRAMYCIN) 100 MG capsule; Take 1 capsule (100  mg) by mouth 2 times daily   Acute sinusitis: antibiotics are 1 tab every 12 hours with big glass of water, remain sitting upright for at least 30 minutes after taking. If sinus infection does not resolve after this please recheck with clinic for follow up. Continue resting, pushing fluids.     Options for treatment and follow-up care were reviewed with the patient. Elizabeth Miner   engaged in the decision making process and verbalized understanding of the options discussed and agreed with the final plan.    THOMPSON Carlos CNP

## 2018-03-07 NOTE — MR AVS SNAPSHOT
After Visit Summary   3/7/2018    Elizabeth Miner    MRN: [id number]           Patient Information     Date Of Birth          1985        Visit Information        Provider Department      3/7/2018 3:30 PM Vaishali Reyes APRN [facility] [employer]        Today's Diagnoses     Acute sinusitis treated with antibiotics in the past 60 days    -  1      Care Instructions      Sinusitis (Antibiotic Treatment)    The sinuses are air-filled spaces within the bones of the face. They connect to the inside of the nose. Sinusitis is an inflammation of the tissue lining the sinus cavity. Sinus inflammation can occur during a cold. It can also be due to allergies to pollens and other particles in the air. Sinusitis can cause symptoms of sinus congestion and fullness. A sinus infection causes fever, headache and facial pain. There is often green or yellow drainage from the nose or into the back of the throat (post-nasal drip). You have been given antibiotics to treat this condition.  Home care:    Take the full course of antibiotics as instructed. Do not stop taking them, even if you feel better.    Drink plenty of water, hot tea, and other liquids. This may help thin mucus. It also may promote sinus drainage.    Heat may help soothe painful areas of the face. Use a towel soaked in hot water. Or,  the shower and direct the hot spray onto your face. Using a vaporizer along with a menthol rub at night may also help.     An expectorant containing guaifenesin may help thin the mucus and promote drainage from the sinuses.    Over-the-counter decongestants may be used unless a similar medicine was prescribed. Nasal sprays work the fastest. Use one that contains phenylephrine or oxymetazoline. First blow the nose gently. Then use the spray. Do not use these medicines more often than directed on the label or symptoms may get worse. You may also use tablets containing pseudoephedrine. Avoid products that  combine ingredients, because side effects may be increased. Read labels. You can also ask the pharmacist for help. (NOTE: Persons with high blood pressure should not use decongestants. They can raise blood pressure.)    Over-the-counter antihistamines may help if allergies contributed to your sinusitis.      Do not use nasal rinses or irrigation during an acute sinus infection, unless told to by your health care provider. Rinsing may spread the infection to other sinuses.    Use acetaminophen or ibuprofen to control pain, unless another pain medicine was prescribed. (If you have chronic liver or kidney disease or ever had a stomach ulcer, talk with your doctor before using these medicines. Aspirin should never be used in anyone under 18 years of age who is ill with a fever. It may cause severe liver damage.)    Don't smoke. This can worsen symptoms.  Follow-up care  Follow up with your healthcare provider or our staff if you are not improving within the next week.  When to seek medical advice  Call your healthcare provider if any of these occur:    Facial pain or headache becoming more severe    Stiff neck    Unusual drowsiness or confusion    Swelling of the forehead or eyelids    Vision problems, including blurred or double vision    Fever of 100.4 F (38 C) or higher, or as directed by your healthcare provider    Seizure    Breathing problems    Symptoms not resolving within 10 days  Date Last Reviewed: 4/13/2015 2000-2017 [employer]. [address] [place] [address], [place] [address]. All rights reserved. This information is not intended as a substitute for professional medical care. Always follow your healthcare professional's instructions.       antibiotics are 1 tab every 12 hours with big glass of water, remain sitting upright for at least 30 minutes after taking. If sinus infection does not resolve after this please recheck with clinic for follow up.           Follow-ups after your visit       "  Follow-up notes from your care team     Return if symptoms worsen or fail to improve.      Who to contact     Please call your clinic at [phone] to:    Ask questions about your health    Make or cancel appointments    Discuss your medicines    Learn about your test results    Speak to your doctor            Additional Information About Your Visit        [employer] Information     [employer] gives you secure access to your electronic health record. If you see a primary care provider, you can also send messages to your care team and make appointments. If you have questions, please call your primary care clinic.  If you do not have a primary care provider, please call [phone] and they will assist you.      [employer] is an electronic gateway that provides easy, online access to your medical records. With [employer], you can request a clinic appointment, read your test results, renew a prescription or communicate with your care team.     To access your existing account, please contact your [facility] or call [phone] for assistance.        Care EveryWhere ID     This is your Care EveryWhere ID. This could be used by other organizations to access your [facility] medical records  [id number]        Your Vitals Were     Pulse Temperature Respirations Height Last Period Pulse Oximetry    86 97.3  F (36.3  C) (Oral) 14 5' 4.9\" (164.8 cm) 02/06/2018 (Exact Date) 98%    Breastfeeding? BMI (Body Mass Index)                No 28.88 kg/m2           Blood Pressure from Last 3 Encounters:   03/07/18 123/78   02/15/18 123/80   05/23/17 116/74    Weight from Last 3 Encounters:   03/07/18 173 lb (78.5 kg)   02/15/18 168 lb 12.8 oz (76.6 kg)   05/23/17 172 lb 8 oz (78.2 kg)              Today, you had the following     No orders found for display         Today's Medication Changes          These changes are accurate as of 3/7/18  4:00 PM.  If you have any questions, ask your nurse or doctor.          "      Start taking these medicines.        Dose/Directions    doxycycline 100 MG capsule   Commonly known as:  VIBRAMYCIN   Used for:  Acute sinusitis treated with antibiotics in the past 60 days   Started by:  Vaishali Reyes APRN CNP        Dose:  100 mg   Take 1 capsule (100 mg) by mouth 2 times daily   Quantity:  28 capsule   Refills:  0            Where to get your medicines      These medications were sent to [id number] - [place], [place] - [address]  [address] [address]     Phone:  [phone]     doxycycline 100 MG capsule                Primary Care Provider Office Phone # Fax #    Marisol Agnieszka Gray [phone] [address] [address]        Equal Access to Services     GUNJAN VANEGAS : Hadii aad ku hadasho Soomaali, waaxda luqadaha, qaybta kaalmada adeegyada, jennifer gutierrez . So [facility] [phone].    ATENCIÓN: Si habla español, tiene a calles disposición servicios gratuitos de asistencia lingüística. Ll[facility] [phone].    We comply with applicable federal civil rights laws and [place] laws. We do not discriminate on the basis of race, color, national origin, age, disability, sex, sexual orientation, or gender identity.            Thank you!     Thank you for choosing [facility]  for your care. Our goal is always to provide you with excellent care. Hearing back from our patients is one way we can continue to improve our services. Please take a few minutes to complete the written survey that you may receive in the mail after your visit with us. Thank you!             Your Updated Medication List - Protect others around you: Learn how to safely use, store and throw away your medicines at www.disposemymeds.org.          This list is accurate as of 3/7/18  4:00 PM.  Always use your most recent med list.                   Brand Name Dispense Instructions for use Diagnosis     doxycycline 100 MG capsule    VIBRAMYCIN    28 capsule    Take 1 capsule (100 mg) by mouth 2 times daily    Acute sinusitis treated with antibiotics in the past 60 days       FLUoxetine HCl 60 MG Tabs     90 tablet    Take 60 mg by mouth daily    Adjustment disorder with depressed mood       fluticasone 50 MCG/ACT spray    FLONASE     Spray 2 sprays into both nostrils as needed for rhinitis or allergies        norgestimate-ethinyl estradiol 0.25-35 MG-MCG per tablet    ORTHO-CYCLEN SPRINTEC    84 tablet    Take 1 tablet by mouth daily    Encounter for initial prescription of contraceptive pills

## 2018-03-07 NOTE — PATIENT INSTRUCTIONS
Sinusitis (Antibiotic Treatment)    The sinuses are air-filled spaces within the bones of the face. They connect to the inside of the nose. Sinusitis is an inflammation of the tissue lining the sinus cavity. Sinus inflammation can occur during a cold. It can also be due to allergies to pollens and other particles in the air. Sinusitis can cause symptoms of sinus congestion and fullness. A sinus infection causes fever, headache and facial pain. There is often green or yellow drainage from the nose or into the back of the throat (post-nasal drip). You have been given antibiotics to treat this condition.  Home care:    Take the full course of antibiotics as instructed. Do not stop taking them, even if you feel better.    Drink plenty of water, hot tea, and other liquids. This may help thin mucus. It also may promote sinus drainage.    Heat may help soothe painful areas of the face. Use a towel soaked in hot water. Or,  the shower and direct the hot spray onto your face. Using a vaporizer along with a menthol rub at night may also help.     An expectorant containing guaifenesin may help thin the mucus and promote drainage from the sinuses.    Over-the-counter decongestants may be used unless a similar medicine was prescribed. Nasal sprays work the fastest. Use one that contains phenylephrine or oxymetazoline. First blow the nose gently. Then use the spray. Do not use these medicines more often than directed on the label or symptoms may get worse. You may also use tablets containing pseudoephedrine. Avoid products that combine ingredients, because side effects may be increased. Read labels. You can also ask the pharmacist for help. (NOTE: Persons with high blood pressure should not use decongestants. They can raise blood pressure.)    Over-the-counter antihistamines may help if allergies contributed to your sinusitis.      Do not use nasal rinses or irrigation during an acute sinus infection, unless told to by  your health care provider. Rinsing may spread the infection to other sinuses.    Use acetaminophen or ibuprofen to control pain, unless another pain medicine was prescribed. (If you have chronic liver or kidney disease or ever had a stomach ulcer, talk with your doctor before using these medicines. Aspirin should never be used in anyone under 18 years of age who is ill with a fever. It may cause severe liver damage.)    Don't smoke. This can worsen symptoms.  Follow-up care  Follow up with your healthcare provider or our staff if you are not improving within the next week.  When to seek medical advice  Call your healthcare provider if any of these occur:    Facial pain or headache becoming more severe    Stiff neck    Unusual drowsiness or confusion    Swelling of the forehead or eyelids    Vision problems, including blurred or double vision    Fever of 100.4 F (38 C) or higher, or as directed by your healthcare provider    Seizure    Breathing problems    Symptoms not resolving within 10 days  Date Last Reviewed: 4/13/2015 2000-2017 The "[employer]. [address] [place] [address]. All rights reserved. This information is not intended as a substitute for professional medical care. Always follow your healthcare professional's instructions.       antibiotics are 1 tab every 12 hours with big glass of water, remain sitting upright for at least 30 minutes after taking. If sinus infection does not resolve after this please recheck with clinic for follow up.

## 2018-03-07 NOTE — NURSING NOTE
"32 year old  Chief Complaint   Patient presents with     RECHECK     Sinusitis - Not sure if shes still sick or has something new. Took abx.        Blood pressure 123/78, pulse 86, temperature 97.3  F (36.3  C), temperature source Oral, resp. rate 14, height 5' 4.9\" (164.8 cm), weight 173 lb (78.5 kg), last menstrual period 02/06/2018, SpO2 98 %, not currently breastfeeding. Body mass index is 28.88 kg/(m^2).  BP completed using cuff size:    Patient Active Problem List   Diagnosis     Major depressive disorder, recurrent episode, in partial remission with anxious distress (H)     Cellulitis of other specified site     Asplenia       Wt Readings from Last 2 Encounters:   03/07/18 173 lb (78.5 kg)   02/15/18 168 lb 12.8 oz (76.6 kg)     BP Readings from Last 3 Encounters:   03/07/18 123/78   02/15/18 123/80   05/23/17 116/74       Allergies   Allergen Reactions     Azithromycin      Lips swell     Cephalexin      hives     Penicillins      hives     Seasonal Allergies        Current Outpatient Prescriptions   Medication     FLUoxetine HCl 60 MG TABS     norgestimate-ethinyl estradiol (ORTHO-CYCLEN, SPRINTEC) 0.25-35 MG-MCG per tablet     sulfamethoxazole-trimethoprim (BACTRIM DS/SEPTRA DS) 800-160 MG per tablet     sulfamethoxazole-trimethoprim (BACTRIM DS/SEPTRA DS) 800-160 MG per tablet     FLUoxetine (PROZAC) 40 MG capsule     FLUoxetine (PROZAC) 10 MG capsule     fluticasone (FLONASE) 50 MCG/ACT nasal spray     No current facility-administered medications for this visit.        Social History   Substance Use Topics     Smoking status: Never Smoker     Smokeless tobacco: Never Used     Alcohol use 0.0 oz/week     0 Standard drinks or equivalent per week      Comment: occasional social drinking       There are no preventive care reminders to display for this patient.    Lab Results   Component Value Date    PAP NIL 05/20/2016       PHQ-2 ( 1999 Pf[employer]) 5/1/2017 9/19/2016   Q1: Little interest or pleasure in doing " things 1 -   Q2: Feeling down, depressed or hopeless 1 -   PHQ-2 Score 2 -   Q1: Little interest or pleasure in doing things - More than half the days   Q2: Feeling down, depressed or hopeless - More than half the days   PHQ-2 Score - 4       PHQ-9 SCORE 8/27/2016 9/19/2016 10/31/2016 5/1/2017   Total Score MyChart 11 (Moderate depression) 9 (Mild depression) - -   Total Score - - 10 4   Some encounter information is confidential and restricted. Go to Review [employer] activity to see all data.       ELY-7 SCORE 8/27/2016 9/19/2016 10/31/2016   Total Score 3 (minimal anxiety) 3 (minimal anxiety) -   Total Score - - 3   Some encounter information is confidential and restricted. Go to Review [email or web address] activity to see all data.       No flowsheet data found.    Loretta Farah CMA  March 7, 2018 3:41 PM

## 2018-03-14 NOTE — PROGRESS NOTES
"Elizabeth Miner is a 32 year old female who presents today for a sore throat, runny nose, mucus running down the back of her throat.  She feels that her eyes are swollen, when blowing her nose she feels this in her jaw.  She does not have a fever, has a slight headache.      Review Of Systems   ROS: 10 point ROS neg other than the symptoms noted above in the HPI.    No past medical history on file.  Past Surgical History:   Procedure Laterality Date     TONSILLECTOMY  2009     Social History     Social History     Marital status: Single     Spouse name: N/A     Number of children: N/A     Years of education: N/A     Occupational History     Post-Doc [employer]     Social History Main Topics     Smoking status: Never Smoker     Smokeless tobacco: Never Used     Alcohol use 0.0 oz/week     0 Standard drinks or equivalent per week      Comment: occasional social drinking     Drug use: No     Sexual activity: Not on file      Comment: Not asked.     Other Topics Concern     Not on file     Social History Narrative    Patient has her Ph.D.   She moved to [place] in 2015 from [place].        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        Current stressors: Her brother (34 y.o. 2017) has had osteosarcoma of the left hip for 2 years.  He is currently not responding well to treatment.  Mother and father  in 2016.        Social [place] limited to lab mates.         Family History   Problem Relation Age of Onset     Bone Cancer Brother 32     Osteosarcoma of the left hip     Pacemaker Other      Grandmother had pacemaker placed. (mat? pat?)     Glaucoma No family hx of      Macular Degeneration No family hx of        /80 (BP Location: Left arm, Patient Position: Chair, Cuff Size: Adult Regular)  Pulse 95  Temp 98.2  F (36.8  C) (Oral)  Resp 16  Ht 5' 4.3\" (163.3 cm)  Wt 168 lb 12.8 oz (76.6 kg)  LMP 02/06/2018 (Exact Date)  SpO2 97%  Breastfeeding? No  BMI " 28.7 kg/m2    Exam:  Constitutional: healthy, alert and no distress  Head: Normocephalic. No masses, lesions, tenderness or abnormalities  Neck: Neck supple. No adenopathy. Thyroid symmetric, normal size,, Carotids without bruits.  ENT: pharynx erythematous without exudate and bilateral maxillary sinus tenderness, rapid strep test positive  Cardiovascular: negative, PMI normal. No lifts, heaves, or thrills. RRR. No murmurs, clicks gallops or rub  Respiratory: negative, Percussion normal. Good diaphragmatic excursion. Lungs clear  : Deferred  Psychiatric: mentation appears normal and affect normal/bright  Hematologic/Lymphatic/Immunologic: Normal cervical lymph nodes    Assessment/Plan:  1. Throat pain    - Strep Screen Rapid (Group) ([facility])  - Strep Culture (GABS)    2. Acute maxillary sinusitis, recurrence not specified  Symptomatic treatment, decongestant, warm moist packs to sinuses    RTC in one week if symptoms persist.

## 2018-03-28 ENCOUNTER — MYC REFILL (OUTPATIENT)
Dept: INTERNAL MEDICINE | Facility: CLINIC | Age: 33
End: 2018-03-28

## 2018-03-28 DIAGNOSIS — F43.21 ADJUSTMENT DISORDER WITH DEPRESSED MOOD: ICD-10-CM

## 2018-03-28 DIAGNOSIS — Z30.011 ENCOUNTER FOR INITIAL PRESCRIPTION OF CONTRACEPTIVE PILLS: ICD-10-CM

## 2018-03-28 RX ORDER — NORGESTIMATE AND ETHINYL ESTRADIOL 0.25-0.035
1 KIT ORAL DAILY
Qty: 84 TABLET | Refills: 3 | Status: CANCELLED | OUTPATIENT
Start: 2018-03-28

## 2018-03-28 RX ORDER — FLUOXETINE HYDROCHLORIDE 60 MG/1
60 TABLET, FILM COATED ORAL; ORAL DAILY
Qty: 90 TABLET | Refills: 3 | Status: CANCELLED | OUTPATIENT
Start: 2018-03-28

## 2018-03-29 DIAGNOSIS — Z30.011 ENCOUNTER FOR INITIAL PRESCRIPTION OF CONTRACEPTIVE PILLS: ICD-10-CM

## 2018-03-29 DIAGNOSIS — F43.21 ADJUSTMENT DISORDER WITH DEPRESSED MOOD: ICD-10-CM

## 2018-03-29 RX ORDER — FLUOXETINE HYDROCHLORIDE 60 MG/1
60 TABLET, FILM COATED ORAL; ORAL DAILY
Qty: 90 TABLET | Refills: 0 | Status: SHIPPED | OUTPATIENT
Start: 2018-03-29 | End: 2018-05-10

## 2018-03-29 RX ORDER — NORGESTIMATE AND ETHINYL ESTRADIOL 0.25-0.035
1 KIT ORAL DAILY
Qty: 84 TABLET | Refills: 0 | Status: SHIPPED | OUTPATIENT
Start: 2018-03-29 | End: 2018-06-20

## 2018-05-10 DIAGNOSIS — F43.21 ADJUSTMENT DISORDER WITH DEPRESSED MOOD: ICD-10-CM

## 2018-05-12 RX ORDER — FLUOXETINE HYDROCHLORIDE 60 MG/1
60 TABLET, FILM COATED ORAL; ORAL DAILY
Qty: 30 TABLET | Refills: 0 | Status: SHIPPED | OUTPATIENT
Start: 2018-05-12 | End: 2018-06-20

## 2018-05-12 NOTE — TELEPHONE ENCOUNTER
FLUoxetine HCl 60 MG TABS  Last Written Prescription Date:  3/29/18  Last Fill Quantity: 90,   # refills: 0  Last Office Visit : 5/23/17  Future Office visit:  None    Scheduling has been notified to contact the pt for appointment.

## 2018-05-14 NOTE — TELEPHONE ENCOUNTER
Called patient and left message to call back and schedule annual physical with Dr. Castro in the Primary Care Clinic.

## 2018-05-14 NOTE — TELEPHONE ENCOUNTER
----- Message from Sabra Garcia RN sent at 5/12/2018  3:29 PM CDT -----  Pt   Elizabeth Miner [[id number]]   [facility]   Matthew  Please call to schedule.    Patient is overdue for annual clinic visit - unless otherwise indicated patient needs to be scheduled with 1st available.    Patient may need labs and or imaging - please check with RN care coordinator.    Thanks    I do not need any follow up on the scheduling of this appointment unless the patient will no longer be receiving care in our clinic.

## 2018-06-20 DIAGNOSIS — F43.21 ADJUSTMENT DISORDER WITH DEPRESSED MOOD: ICD-10-CM

## 2018-06-20 DIAGNOSIS — Z30.011 ENCOUNTER FOR INITIAL PRESCRIPTION OF CONTRACEPTIVE PILLS: ICD-10-CM

## 2018-06-20 RX ORDER — FLUOXETINE HYDROCHLORIDE 60 MG/1
60 TABLET, FILM COATED ORAL; ORAL DAILY
Qty: 30 TABLET | Refills: 0 | Status: SHIPPED | OUTPATIENT
Start: 2018-06-20 | End: 2018-11-15

## 2018-06-20 RX ORDER — NORGESTIMATE AND ETHINYL ESTRADIOL 0.25-0.035
1 KIT ORAL DAILY
Qty: 84 TABLET | Refills: 0 | Status: SHIPPED | OUTPATIENT
Start: 2018-06-20 | End: 2018-07-13

## 2018-07-03 DIAGNOSIS — F43.21 ADJUSTMENT DISORDER WITH DEPRESSED MOOD: ICD-10-CM

## 2018-07-05 RX ORDER — FLUOXETINE HYDROCHLORIDE 60 MG/1
TABLET, FILM COATED ORAL; ORAL
Qty: 30 TABLET | Refills: 0 | Status: SHIPPED | OUTPATIENT
Start: 2018-07-05 | End: 2018-07-13

## 2018-07-13 ENCOUNTER — OFFICE VISIT (OUTPATIENT)
Dept: INTERNAL MEDICINE | Facility: CLINIC | Age: 33
End: 2018-07-13
Payer: COMMERCIAL

## 2018-07-13 VITALS
TEMPERATURE: 98.1 F | OXYGEN SATURATION: 98 % | RESPIRATION RATE: 16 BRPM | HEIGHT: 65 IN | HEART RATE: 100 BPM | SYSTOLIC BLOOD PRESSURE: 117 MMHG | WEIGHT: 175.3 LBS | BODY MASS INDEX: 29.2 KG/M2 | DIASTOLIC BLOOD PRESSURE: 73 MMHG

## 2018-07-13 DIAGNOSIS — Z30.011 ENCOUNTER FOR INITIAL PRESCRIPTION OF CONTRACEPTIVE PILLS: ICD-10-CM

## 2018-07-13 RX ORDER — NORGESTIMATE AND ETHINYL ESTRADIOL 0.25-0.035
1 KIT ORAL DAILY
Qty: 84 TABLET | Refills: 3 | Status: SHIPPED | OUTPATIENT
Start: 2018-07-13 | End: 2019-08-10

## 2018-07-13 ASSESSMENT — PAIN SCALES - GENERAL: PAINLEVEL: NO PAIN (0)

## 2018-07-13 NOTE — NURSING NOTE
Chief Complaint   Patient presents with     Physical     Patient is here for annual physical exam       Td Crawford CMA (AAMA) at 9:04 AM on 7/13/2018

## 2018-07-13 NOTE — MR AVS SNAPSHOT
After Visit Summary   7/13/2018    Elizabeth Miner    MRN: [id number]           Patient Information     Date Of Birth          1985        Visit Information        Provider Department      7/13/2018 9:00 AM Marisol Serrato MD [facility]        Today's Diagnoses     Encounter for initial prescription of contraceptive pills          Care Instructions    Primary Care Center Medication Refill Request Information:  * Please contact your pharmacy regarding ANY request for medication refills.  ** [facility] Prescription Fax = [phone]  * Please allow 3 business days for routine medication refills.  * Please allow 5 business days for controlled substance medication refills.     Primary Care Center Test Result notification information:  *You will be notified with in 7-10 days of your appointment day regarding the results of your test.  If you are on MyChart you will be notified as soon as the provider has reviewed the results and signed off on them.    [facility] [phone]             Follow-ups after your visit        Who to contact     Please call your clinic at [phone] to:    Ask questions about your health    Make or cancel appointments    Discuss your medicines    Learn about your test results    Speak to your doctor            Additional Information About Your Visit        MyChart Information     LetGive gives you secure access to your electronic health record. If you see a primary care provider, you can also send messages to your care team and make appointments. If you have questions, please call your primary care clinic.  If you do not have a primary care provider, please call [phone] and they will assist you.      LetGive is an electronic gateway that provides easy, online access to your medical records. With LetGive, you can request a clinic appointment, read your test results, renew a prescription or communicate with your care team.     To access  "your existing account, please contact your [facility] or call [phone] for assistance.        Care EveryWhere ID     This is your Care EveryWhere ID. This could be used by other organizations to access your [facility] medical records  [id number]        Your Vitals Were     Pulse Temperature Respirations Height Last Period Pulse Oximetry    100 98.1  F (36.7  C) (Oral) 16 1.646 m (5' 4.8\") 06/29/2018 (Approximate) 98%    Breastfeeding? BMI (Body Mass Index)                No 29.35 kg/m2           Blood Pressure from Last 3 Encounters:   07/13/18 117/73   03/07/18 123/78   02/15/18 123/80    Weight from Last 3 Encounters:   07/13/18 79.5 kg (175 lb 4.8 oz)   03/07/18 78.5 kg (173 lb)   02/15/18 76.6 kg (168 lb 12.8 oz)              Today, you had the following     No orders found for display         Today's Medication Changes          These changes are accurate as of 7/13/18  9:19 AM.  If you have any questions, ask your nurse or doctor.               These medicines have changed or have updated prescriptions.        Dose/Directions    FLUoxetine HCl 60 MG Tabs   This may have changed:  Another medication with the same name was removed. Continue taking this medication, and follow the directions you see here.   Used for:  Adjustment disorder with depressed mood   Changed by:  Marisol Serrato MD        Dose:  60 mg   Take 60 mg by mouth daily   Quantity:  30 tablet   Refills:  0            Where to get your medicines      These medications were sent to [facility] [id number] [place], [place] - [address]  [address] [address]     Phone:  [phone]     norgestimate-ethinyl estradiol 0.25-35 MG-MCG per tablet                Primary Care Provider Office Phone # Fax #    Marisol Gray [phone]        [address] [address]        Equal Access to Services     GUNJAN VANEGAS AH: Rolfii mariela bond " delia Yan, luis eduardo fletcherandrewha, rimma luanabri jessirosita, jennifer katiein hayaan jessimay salimathiago lamaria terin shakeel. So [facility] [phone].    ATENCIÓN: Si habla brigida, tiene a calles disposición servicios gratuitos de asistencia lingüística. Rabia al [phone].    We comply with applicable federal civil rights laws and [place] laws. We do not discriminate on the basis of race, color, national origin, age, disability, sex, sexual orientation, or gender identity.            Thank you!     Thank you for choosing [facility]  for your care. Our goal is always to provide you with excellent care. Hearing back from our patients is one way we can continue to improve our services. Please take a few minutes to complete the written survey that you may receive in the mail after your visit with us. Thank you!             Your Updated Medication List - Protect others around you: Learn how to safely use, store and throw away your medicines at www.disposemymeds.org.          This list is accurate as of 7/13/18  9:19 AM.  Always use your most recent med list.                   Brand Name Dispense Instructions for use Diagnosis    FLUoxetine HCl 60 MG Tabs     30 tablet    Take 60 mg by mouth daily    Adjustment disorder with depressed mood       fluticasone 50 MCG/ACT spray    FLONASE     Spray 2 sprays into both nostrils as needed for rhinitis or allergies        norgestimate-ethinyl estradiol 0.25-35 MG-MCG per tablet    ORTHO-CYCLEN, SPRINTEC    84 tablet    Take 1 tablet by mouth daily    Encounter for initial prescription of contraceptive pills

## 2018-07-13 NOTE — PATIENT INSTRUCTIONS
[facility] Medication Refill Request Information:  * Please contact your pharmacy regarding ANY request for medication refills.  ** [facility] Prescription Fax = [phone]  * Please allow 3 business days for routine medication refills.  * Please allow 5 business days for controlled substance medication refills.     [facility] Test Result notification information:  *You will be notified with in 7-10 days of your appointment day regarding the results of your test.  If you are on MyChart you will be notified as soon as the provider has reviewed the results and signed off on them.    [facility] [phone]

## 2018-07-19 NOTE — PROGRESS NOTES
Elizabeth was seen today for physical and refill of OCP.  She feels well, no concerns.    No changes to past, family, or social history.   ROS: 10 point ROS neg other than the symptoms noted above in the HPI.    PHYSICAL EXAM:  B/P: 117/73, T: 98.1, P: 100, R: 16  Constitutional: no distress, comfortable, pleasant   Eyes: anicteric, normal extra-ocular movements   Ears, Nose and Throat: tympanic membranes clear, nose clear and free of lesions, throat clear, neck supple with full range of motion, no thyromegaly.   Cardiovascular: regular rate and rhythm, normal S1 and S2, no murmurs, rubs or gallops, peripheral pulses full and symmetric   Respiratory: clear to auscultation, no wheezes or crackles, normal breath sounds   Gastrointestinal: positive bowel sounds, nontender, no hepatosplenomegaly, no masses   Musculoskeletal: knees full range of motion, no effusion   Skin: no concerning lesions, no jaundice   Neurological: normal gait, no tremor   Psychological: appropriate mood   Lymphatic: no cervical lymphadenopathy and no pedal edema    A/P  33 year old here for physical and med refill.  Pap due next year, 2019.  Other HCM is UTD.    Encounter for initial prescription of contraceptive pills  -     norgestimate-ethinyl estradiol (ORTHO-CYCLEN, SPRINTEC) 0.25-35 MG-MCG per tablet; Take 1 tablet by mouth daily    Isaac Castro MD

## 2018-11-11 ENCOUNTER — MYC REFILL (OUTPATIENT)
Dept: INTERNAL MEDICINE | Facility: CLINIC | Age: 33
End: 2018-11-11

## 2018-11-11 DIAGNOSIS — F43.21 ADJUSTMENT DISORDER WITH DEPRESSED MOOD: ICD-10-CM

## 2018-11-12 RX ORDER — FLUOXETINE HYDROCHLORIDE 60 MG/1
60 TABLET, FILM COATED ORAL; ORAL DAILY
Qty: 90 TABLET | Refills: 0 | Status: CANCELLED | OUTPATIENT
Start: 2018-11-12

## 2018-11-12 NOTE — TELEPHONE ENCOUNTER
Message from Carmen:  Galina Velasco RN Mon Nov 12, 2018 7:24 AM        ----- Message -----   From: Elizabeth Miner   Sent: 11/11/2018 5:23 PM   To: [facility] Nursing Staff-  Subject: Medication Renewal Request     Original authorizing provider: MD Elizabeth Brooks would like a refill of the following medications:  FLUoxetine HCl 60 MG TABS [Isaac Castro MD]    Preferred pharmacy: Other - see message text    Comment:  Hi! I moved and now have totally different health insurance, and now the fluoxetine 60 mg tablets are really expensive ($100+ for a 3 month supply). Can you change the prescription to 3x 20 mg tablets? Those are considerably less expensive. Here's the information for my new pharmacy: [facility] at [address] [place] [address], Contact number [phone], Store #[id number] Please call me if you have any questions, or can't do this for any reason. Thank you!

## 2018-11-12 NOTE — TELEPHONE ENCOUNTER
FLUoxetine HCl 60 MG TABS  Last Written Prescription Date:  6/20/18  Last Fill Quantity: 30,   # refills: 0  Last Office Visit : 7/13/18  Future Office visit:  None

## 2018-11-15 RX ORDER — FLUOXETINE 20 MG/1
60 TABLET, FILM COATED ORAL DAILY
Qty: 270 TABLET | Refills: 1 | Status: SHIPPED | OUTPATIENT
Start: 2018-11-15 | End: 2018-11-16

## 2018-11-16 ENCOUNTER — TELEPHONE (OUTPATIENT)
Dept: INTERNAL MEDICINE | Facility: CLINIC | Age: 33
End: 2018-11-16

## 2018-11-16 DIAGNOSIS — F43.21 ADJUSTMENT DISORDER WITH DEPRESSED MOOD: ICD-10-CM

## 2018-11-16 NOTE — TELEPHONE ENCOUNTER
Health Call Center    Phone Message    May a detailed message be left on voicemail: yes    Reason for Call: Medication Question or concern regarding medication   Prescription Clarification  Name of Medication: FLUoxetine 20 MG tablet     Prescribing Provider: Marisol Littlejohn  Pharmacy:[facility] [id number] [place] [address]   What on the order needs clarification? Pt called to say that she has the tablet form of the FLUoxetine and her insurance will not cover that so she is asking for Dr. Castro to call Pharmacy and say that she needs the capsule form of the FLUoxetine. Or have dr. Castro write a new RX for the capsules instead. Please call Pharmacy and Patient if needed.         Action Taken: Message routed to:  [facility] ([facility]): puma [facility] med

## 2019-05-14 DIAGNOSIS — F43.21 ADJUSTMENT DISORDER WITH DEPRESSED MOOD: ICD-10-CM

## 2019-05-14 NOTE — TELEPHONE ENCOUNTER
Last Clinic Visit: 7/13/2018  [facility]    Per refill protocol, appointment and test(s) past due-PHQ-9.  Sonia refill 90 days and FYI to clinic CMA.

## 2019-08-10 DIAGNOSIS — Z30.011 ENCOUNTER FOR INITIAL PRESCRIPTION OF CONTRACEPTIVE PILLS: ICD-10-CM

## 2019-08-13 RX ORDER — NORGESTIMATE AND ETHINYL ESTRADIOL 0.25-0.035
1 KIT ORAL DAILY
Qty: 84 TABLET | Refills: 0 | Status: SHIPPED | OUTPATIENT
Start: 2019-08-13

## 2019-08-13 NOTE — TELEPHONE ENCOUNTER
norgestimate-ethinyl estradiol (ORTHO-CYCLEN, SPRINTEC) 0.25-35 MG-MCG per tablet      Last Written Prescription Date:  7/13/18  Last Fill Quantity: 84,   # refills: 3  Last Office Visit : 7/13/18  Future Office visit:  none    90 day erick to pharmacy.    Patient instructed to call clinic to schedule annual appointment.     Scheduling has been notified to contact the pt for appointment.

## 2020-01-31 DIAGNOSIS — Z30.011 ENCOUNTER FOR INITIAL PRESCRIPTION OF CONTRACEPTIVE PILLS: ICD-10-CM

## 2020-01-31 RX ORDER — NORGESTIMATE AND ETHINYL ESTRADIOL 0.25-0.035
1 KIT ORAL DAILY
Qty: 30 TABLET | OUTPATIENT
Start: 2020-01-31

## 2020-01-31 NOTE — TELEPHONE ENCOUNTER
SPRINTEC 28 DAY TABLET    Last Written Prescription Date:  8/13/2019  Last Fill Quantity: 84,   # refills: 0  Last Office Visit : 7/13/2018  Future Office visit:  None    Routing refill request to provider for review/approval because:  Office Visit due         30 day pended  Nurse - this is a second refill request for medication with out or overdue labs. With last request pt was given 90 day erick and Anna notified.      Joan Lazaro RN  Central Triage Red Flags/Med Refills

## 2020-01-31 NOTE — TELEPHONE ENCOUNTER
Last office visit 7/13/18. Per notes, patient now resides in [place] and is receiving care outside of [place].    Medication refill denied.    Lita Recinos RN (Brasch)

## 2020-03-10 ENCOUNTER — HEALTH MAINTENANCE LETTER (OUTPATIENT)
Age: 35
End: 2020-03-10

## 2020-12-27 ENCOUNTER — HEALTH MAINTENANCE LETTER (OUTPATIENT)
Age: 35
End: 2020-12-27

## 2021-04-24 ENCOUNTER — HEALTH MAINTENANCE LETTER (OUTPATIENT)
Age: 36
End: 2021-04-24

## 2021-10-09 ENCOUNTER — HEALTH MAINTENANCE LETTER (OUTPATIENT)
Age: 36
End: 2021-10-09

## 2021-11-09 NOTE — MR AVS SNAPSHOT
After Visit Summary   9/29/2017    Elizabeth Miner    MRN: [id number]           Patient Information     Date Of Birth          1985        Visit Information        Provider Department      9/29/2017 10:00 AM Lenard Kim, PhD DOMINIC [facility]        Today's Diagnoses     Major depressive disorder, recurrent episode, in partial remission with anxious distress (H)    -  1       Follow-ups after your visit        Your next 10 appointments already scheduled     Oct 13, 2017  2:00 PM CDT   (Arrive by 1:45 PM)   Return Visit with Lenard Kim, PhD DOMINIC   [facility] ([facility])    [address] [place] [address] [phone]            Oct 27, 2017  2:00 PM CDT   (Arrive by 1:45 PM)   Return Visit with Lenard Kim, PhD DOMINIC   [facility] ([facility])    [address] [place] [address] [phone]              Who to contact     Please call your clinic at [phone] to:    Ask questions about your health    Make or cancel appointments    Discuss your medicines    Learn about your test results    Speak to your doctor   If you have compliments or concerns about an experience at your clinic, or if you wish to file a complaint, please contact [facility] Patient Relations at [phone] or email us at [email or web address]         Additional Information About Your Visit        MyChart Information     [employer] gives you secure access to your electronic health record. If you see a primary care provider, you can also send messages to your care team and make appointments. If you have questions, please call your primary care clinic.  If you do not have a primary care provider, please call [phone] and they will assist you.      [employer] is an electronic gateway that provides easy, online access to your  medical records. With [employer], you can request a clinic appointment, read your test results, renew a prescription or communicate with your care team.     To access your existing account, please contact your [facility] or call [phone] for assistance.        Care EveryWhere ID     This is your Care EveryWhere ID. This could be used by other organizations to access your Elizabeth medical records  [id number]         Blood Pressure from Last 3 Encounters:   05/23/17 116/74   05/01/17 127/88   10/31/16 111/77    Weight from Last 3 Encounters:   05/23/17 78.2 kg (172 lb 8 oz)   05/01/17 78.5 kg (173 lb 1.6 oz)   10/31/16 79.4 kg (175 lb)              Today, you had the following     No orders found for display         Today's Medication Changes          These changes are accurate as of: 9/29/17  1:03 PM.  If you have any questions, ask your nurse or doctor.               These medicines have changed or have updated prescriptions.        Dose/Directions    * FLUoxetine 40 MG capsule   Commonly known as:  PROzac   This may have changed:  Another medication with the same name was changed. Make sure you understand how and when to take each.   Used for:  Adjustment disorder with depressed mood        Dose:  40 mg   Take 1 capsule (40 mg) by mouth daily   Quantity:  90 capsule   Refills:  0       * FLUoxetine 10 MG capsule   Commonly known as:  PROzac   This may have changed:    - how much to take  - additional instructions   Used for:  Adjustment disorder with depressed mood        Dose:  10 mg   Take 1 capsule (10 mg) by mouth daily   Quantity:  90 capsule   Refills:  0       * FLUoxetine HCl 60 MG Tabs   This may have changed:  Another medication with the same name was changed. Make sure you understand how and when to take each.   Used for:  Adjustment disorder with depressed mood        Dose:  60 mg   Take 60 mg by mouth daily   Quantity:  90 tablet   Refills:  3       * Notice:  This  list has 3 medication(s) that are the same as other medications prescribed for you. Read the directions carefully, and ask your doctor or other care provider to review them with you.             Primary Care Provider Office Phone # Fax Elisa    Marisol Agnieszka Gray [phone] [address] [address]        Equal Access to Services     [facility]: Hadii aad ku hadasho Soomaali, waaxda luqadaha, qaybta kaalmada adeegyada, waxay idiin hayaan adeeg kharash la'aan ah. So [facility] [phone].    ATENCIÓN: Si habla español, tiene a calles disposición servicios gratuitos de asistencia lingüística. Rabia al [phone].    We comply with applicable federal civil rights laws and [place] laws. We do not discriminate on the basis of race, color, national origin, age, disability, sex, sexual orientation, or gender identity.            Thank you!     Thank you for choosing [facility]  for your care. Our goal is always to provide you with excellent care. Hearing back from our patients is one way we can continue to improve our services. Please take a few minutes to complete the written survey that you may receive in the mail after your visit with us. Thank you!             Your Updated Medication List - Protect others around you: Learn how to safely use, store and throw away your medicines at www.disposemymeds.org.          This list is accurate as of: 9/29/17  1:03 PM.  Always use your most recent med list.                   Brand Name Dispense Instructions for use Diagnosis    * FLUoxetine 40 MG capsule    PROzac    90 capsule    Take 1 capsule (40 mg) by mouth daily    Adjustment disorder with depressed mood       * FLUoxetine 10 MG capsule    PROzac    90 capsule    Take 1 capsule (10 mg) by mouth daily    Adjustment disorder with depressed mood       * FLUoxetine HCl 60 MG Tabs     90 tablet    Take 60 mg by mouth daily    Adjustment disorder with depressed mood        fluticasone 50 MCG/ACT spray    FLONASE     Spray 2 sprays into both nostrils as needed for rhinitis or allergies        norgestimate-ethinyl estradiol 0.25-35 MG-MCG per tablet    ORTHO-CYCLEN, SPRINTEC    84 tablet    Take 1 tablet by mouth daily    Encounter for initial prescription of contraceptive pills       sulfamethoxazole-trimethoprim 800-160 MG per tablet    BACTRIM DS/SEPTRA DS    14 tablet    Take 1 tablet by mouth 2 times daily    Cellulitis of other specified site       * Notice:  This list has 3 medication(s) that are the same as other medications prescribed for you. Read the directions carefully, and ask your doctor or other care provider to review them with you.       labs/CT/MRI results

## 2022-05-16 ENCOUNTER — HEALTH MAINTENANCE LETTER (OUTPATIENT)
Age: 37
End: 2022-05-16

## 2022-09-11 ENCOUNTER — HEALTH MAINTENANCE LETTER (OUTPATIENT)
Age: 37
End: 2022-09-11

## 2022-11-22 NOTE — PROGRESS NOTES
Health Psychology                  [facility]    Department of Medicine  Pilar Moe, Ph.D., L.P. [phone]                          [facility]  [facility] Lenard Kim, Ph.D.,  L.P. [phone]                 Health Psychology - 3rd floor  [place] Mail Code 746   Zhao Chowdhury, Ph.D., A.B.P.P., L.P. [phone]     [address] [place] [address] Carmela Taylor, Ph.D., L.P. [phone]  [place] [address]       Patient: Elizabeth Miner  Patient's YOB: 1985  MRN: [id number]  Psychologist: Lenard Kim, PhD,   Initial Evaluation Date: 8/5/2016  Treatment Plan Date: 8/5/2016      Health Psychology  Confidential Summary of Psychological Evaluation  Confidential Visit Summary    Service: Individual Psychotherapy     Start time:  1:04  Stop time:  1:59  Treatment modality:   Cognitive Behavioral and Supportive Psychotherapy   Insight Oriented Psychotherapy  Goals and Interventions:   Problem Goals   Mood Disorder  Depression  Anxiety   [x] Decrease symptoms  [x] Improve well being  [x] Improve coping  [] Change behavior/cognitions  [x] Improve psychosocial support  [] Improve decision making  [] Improve self-care with diet and exercise  [] Improve sleep habits/sleep quality  [] Monitor psychological status     Interventions Employed Today   [x] Fostered healthy therapeutic alliance  [] Addressed unhealthy cognitions  [x] Addressed unhealthy behaviors  [] Relaxation training  [] Psycho-education  [] Bibliotherapy  [x] Provided support  [] Explored/confronted resistance  [x] Developed insights into cognitions/behaviors  [x] Identified/Promoted adaptive coping strategies  [] Education/training in sleep   [] Education/training in mindfulness tools     Patient s progress on goals:   Discussed current stressors which include: work stress and pressures, financial strain, Trump's recent decisions about science and immigration  which affects her colleagues and neighbors, and her brother's illness which seems to be progressing.   Discussed self-care.  She reported that reducing her stress would benefit from having more stability with her finances. She doesn't have much money in her savings and has credit card debt as well as student loans and a car loan on top of rent.  If she had more money in savings and less credit card debt, this would be helpful.  Discussed actions she could take to improve her situation.    Reviewed general self-care tools.    Patient's response to treatment:  Engaged, reflective, motivated  Plan: Ongoing individual psychotherapy   Current mental health status:   General appearance:  Appropriate, well kept   Mood: Anxious (ranging between 5 and 8 out of 10 in the last few days).   Cognition: Appropriate for age   Orientation: Times three   Insight: fair to good   Memory: Appropriate long term / Short term   Psychosis: Denies   Suicidal / Homicidal Thoughts: Denies      Diagnosis:    Axis I: Major Depressive Disorder, recurrent, in partial remission   Axis II: deferred   Axis III: see EMR   Axis IV: parental divorce, seriously ill family member        Lenard Kim, PhD  Licensed Psychologist  Pager: [phone]       before meals and at bedtime

## 2023-01-22 ENCOUNTER — HEALTH MAINTENANCE LETTER (OUTPATIENT)
Age: 38
End: 2023-01-22

## 2024-02-24 ENCOUNTER — HEALTH MAINTENANCE LETTER (OUTPATIENT)
Age: 39
End: 2024-02-24